# Patient Record
Sex: MALE | NOT HISPANIC OR LATINO | Employment: FULL TIME | URBAN - METROPOLITAN AREA
[De-identification: names, ages, dates, MRNs, and addresses within clinical notes are randomized per-mention and may not be internally consistent; named-entity substitution may affect disease eponyms.]

---

## 2020-07-27 ENCOUNTER — HOSPITAL ENCOUNTER (OUTPATIENT)
Dept: OTHER | Facility: HOSPITAL | Age: 40
Discharge: HOME | End: 2020-07-27
Attending: NURSE PRACTITIONER

## 2020-07-27 LAB
ALBUMIN SERPL-MCNC: 4.6 G/DL (ref 3.5–5)
ALBUMIN/GLOB SERPL: 1.4 {RATIO} (ref 1.4–2.6)
ALP SERPL-CCNC: 65 U/L (ref 53–128)
ALT SERPL-CCNC: 26 U/L (ref 10–40)
ANION GAP SERPL CALC-SCNC: 18 MMOL/L (ref 8–19)
AST SERPL-CCNC: 18 U/L (ref 15–50)
BILIRUB SERPL-MCNC: 0.44 MG/DL (ref 0.2–1.3)
BUN SERPL-MCNC: 8 MG/DL (ref 5–25)
BUN/CREAT SERPL: 7 {RATIO} (ref 6–20)
CALCIUM SERPL-MCNC: 9.7 MG/DL (ref 8.7–10.4)
CHLORIDE SERPL-SCNC: 103 MMOL/L (ref 99–111)
CHOLEST SERPL-MCNC: 222 MG/DL (ref 107–200)
CHOLEST/HDLC SERPL: 6.7 {RATIO} (ref 3–6)
CONV CO2: 24 MMOL/L (ref 22–32)
CONV TOTAL PROTEIN: 7.9 G/DL (ref 6.3–8.2)
CREAT UR-MCNC: 1.09 MG/DL (ref 0.7–1.2)
D-LACTATE SERPL-SCNC: 1.4 MMOL/L (ref 0.7–2.1)
EST. AVERAGE GLUCOSE BLD GHB EST-MCNC: 114 MG/DL
FOLATE SERPL-MCNC: 16.1 NG/ML (ref 4.8–20)
GFR SERPLBLD BASED ON 1.73 SQ M-ARVRAT: >60 ML/MIN/{1.73_M2}
GLOBULIN UR ELPH-MCNC: 3.3 G/DL (ref 2–3.5)
GLUCOSE SERPL-MCNC: 107 MG/DL (ref 70–99)
HBA1C MFR BLD: 5.6 % (ref 3.5–5.7)
HDLC SERPL-MCNC: 33 MG/DL (ref 40–60)
IRON SATN MFR SERPL: 22 % (ref 20–55)
IRON SERPL-MCNC: 77 UG/DL (ref 70–180)
LDLC SERPL CALC-MCNC: 148 MG/DL (ref 70–100)
OSMOLALITY SERPL CALC.SUM OF ELEC: 291 MOSM/KG (ref 273–304)
POTASSIUM SERPL-SCNC: 4 MMOL/L (ref 3.5–5.3)
SODIUM SERPL-SCNC: 141 MMOL/L (ref 135–147)
TIBC SERPL-MCNC: 352 UG/DL (ref 245–450)
TRANSFERRIN SERPL-MCNC: 246 MG/DL (ref 215–365)
TRIGL SERPL-MCNC: 205 MG/DL (ref 40–150)
TSH SERPL-ACNC: 2.6 M[IU]/L (ref 0.27–4.2)
VIT B12 SERPL-MCNC: 367 PG/ML (ref 211–911)
VLDLC SERPL-MCNC: 41 MG/DL (ref 5–37)

## 2020-07-28 LAB
CONV ANTI MICROSOMAL AB: <9 IU/ML (ref 0–34)
CONV THYROGLOBULIN ANTIBODY: <1 IU/ML (ref 0–0.9)
H PYLORI IGA SER QL: <9 UNITS (ref 0–8.9)
H PYLORI IGM SER-ACNC: <9 UNITS (ref 0–8.9)
THYROGLOB SERPL-MCNC: 3.2 NG/ML (ref 1.4–29.2)

## 2021-09-02 PROCEDURE — U0004 COV-19 TEST NON-CDC HGH THRU: HCPCS | Performed by: EMERGENCY MEDICINE

## 2022-01-30 PROCEDURE — U0004 COV-19 TEST NON-CDC HGH THRU: HCPCS | Performed by: PHYSICIAN ASSISTANT

## 2022-01-31 ENCOUNTER — TELEPHONE (OUTPATIENT)
Dept: URGENT CARE | Facility: CLINIC | Age: 42
End: 2022-01-31

## 2022-01-31 NOTE — TELEPHONE ENCOUNTER
----- Message from CHAR Mejia sent at 1/31/2022  6:12 PM EST -----  Please notify patient of negative Covid result.

## 2022-02-01 PROCEDURE — U0004 COV-19 TEST NON-CDC HGH THRU: HCPCS | Performed by: NURSE PRACTITIONER

## 2022-02-02 ENCOUNTER — TELEPHONE (OUTPATIENT)
Dept: URGENT CARE | Facility: CLINIC | Age: 42
End: 2022-02-02

## 2022-02-03 NOTE — TELEPHONE ENCOUNTER
----- Message from Maury Bedoya MD sent at 2/2/2022  7:01 PM EST -----  Please call the patient regarding negative covid

## 2022-10-11 ENCOUNTER — OFFICE VISIT (OUTPATIENT)
Dept: FAMILY MEDICINE CLINIC | Facility: CLINIC | Age: 42
End: 2022-10-11

## 2022-10-11 VITALS
HEIGHT: 66 IN | HEART RATE: 90 BPM | DIASTOLIC BLOOD PRESSURE: 74 MMHG | TEMPERATURE: 98 F | OXYGEN SATURATION: 99 % | WEIGHT: 170.7 LBS | SYSTOLIC BLOOD PRESSURE: 122 MMHG | BODY MASS INDEX: 27.43 KG/M2 | RESPIRATION RATE: 20 BRPM

## 2022-10-11 DIAGNOSIS — E78.2 MIXED HYPERLIPIDEMIA: ICD-10-CM

## 2022-10-11 DIAGNOSIS — H61.23 BILATERAL IMPACTED CERUMEN: ICD-10-CM

## 2022-10-11 DIAGNOSIS — K21.9 GASTROESOPHAGEAL REFLUX DISEASE WITHOUT ESOPHAGITIS: ICD-10-CM

## 2022-10-11 DIAGNOSIS — Z76.89 ESTABLISHING CARE WITH NEW DOCTOR, ENCOUNTER FOR: Primary | ICD-10-CM

## 2022-10-11 DIAGNOSIS — Z86.19 HISTORY OF HELICOBACTER PYLORI INFECTION: ICD-10-CM

## 2022-10-11 DIAGNOSIS — Z80.0 FAMILY HISTORY OF COLON CANCER IN FATHER: ICD-10-CM

## 2022-10-11 DIAGNOSIS — E55.9 VITAMIN D DEFICIENCY: ICD-10-CM

## 2022-10-11 DIAGNOSIS — Z01.89 ROUTINE LAB DRAW: ICD-10-CM

## 2022-10-11 PROCEDURE — 99214 OFFICE O/P EST MOD 30 MIN: CPT

## 2022-10-11 RX ORDER — ATORVASTATIN CALCIUM 10 MG/1
1 TABLET, FILM COATED ORAL EVERY EVENING
COMMUNITY
Start: 2022-09-19

## 2022-10-11 RX ORDER — SUCRALFATE 1 G/1
1 TABLET ORAL 4 TIMES DAILY
Qty: 120 TABLET | Refills: 1 | Status: SHIPPED | OUTPATIENT
Start: 2022-10-11 | End: 2023-01-18 | Stop reason: SDUPTHER

## 2022-10-11 RX ORDER — OMEPRAZOLE 40 MG/1
40 CAPSULE, DELAYED RELEASE ORAL DAILY
Qty: 90 CAPSULE | Refills: 1 | Status: SHIPPED | OUTPATIENT
Start: 2022-10-11 | End: 2022-11-15

## 2022-10-11 NOTE — ASSESSMENT & PLAN NOTE
Stable per last set of blood work, continue atorvastatin at current dose.  We will repeat blood work in 6 months.

## 2022-10-11 NOTE — PROGRESS NOTES
Missael Maloney presents to [facility] with complaints of persistent issues with acid reflux, ear irritation/itchiness, and is here to establish as a new patient.      History of Present Illness  This is a 42-year-old male, past medical history significant for GERD, hyperlipidemia, H. pylori infection, family history of colon cancer in father, who presents to clinic with complaints of persistent issues with acid reflux, ear irritation/itchiness, and is here to establish as a new patient.    GERD: Patient states that he has had issues with this for some time, states about a year to a year and a half ago, he did have an upper scope performed which did not show any acute findings, but did show some inflammation.  Also recently had an infection with H. pylori about a year ago, was treated for this accordingly, did have recent H. pylori testing which was negative.  States that he still having issues with his acid reflux, states that it is worse at night, prohibiting from him being able to sleep.  States that he has tried different medications in the past, has tried Protonix, is tried omeprazole, has not gotten any relief from these.  States that [place] and [place] both [place], and has not had any testing done here in the [place] or seeing a specialist.  States that he has a burning type sensation that will go up the back of his throat, has tried to avoid foods high in acid, but states he still having difficulties with this.  Denies any chest pain, denies any blood in stool or vomit.  Denies any abdominal pain.    Hyperlipidemia: States that he has had high cholesterol since he was about 26 years old, but was recently started on medication about 2 years ago for this.  Patient has been taking atorvastatin daily, doing well on this medication.  Last lipid panel was done back in September, showed slightly elevation of triglycerides, but LDL was less than 100.   "Doing well on this medication.  No acute issues.  Does try to adjust diet as well in regards to this.    Patient also states that father recently went through complete treatment for colon cancer, states that he has had a colonoscopy about a half a year ago, where they did find polyps but nothing else concerning.  Denies any issues with this, no blood in stool, no change in weight, no other acute issues with this at this time.    Patient also has complaints of bilateral ear itchiness/fullness, states has been going on for the past several months    No other acute concerns at this time.    Past Medical History:   Diagnosis Date   • GERD (gastroesophageal reflux disease)    • Hyperlipidemia      No past surgical history on file.    Social History     Socioeconomic History   • Marital status:    Tobacco Use   • Smoking status: Never   • Smokeless tobacco: Never   Vaping Use   • Vaping Use: Never used   Substance and Sexual Activity   • Alcohol use: Never   • Drug use: Never   • Sexual activity: Defer     History reviewed. No pertinent family history.      Objective   Vital Signs:   /74   Pulse 90   Temp 98 °F (36.7 °C)   Resp 20   Ht 167.6 cm (66\")   Wt 77.4 kg (170 lb 11.2 oz)   SpO2 99%   BMI 27.55 kg/m²     Body mass index is 27.55 kg/m².    All labs, imaging, test results, and specialty provider notes reviewed with patient.       Physical Exam  Vitals reviewed.   Constitutional:       Appearance: Normal appearance.   HENT:      Right Ear: There is impacted cerumen.      Left Ear: There is impacted cerumen.   Cardiovascular:      Rate and Rhythm: Normal rate and regular rhythm.      Pulses: Normal pulses.      Heart sounds: Normal heart sounds.   Pulmonary:      Effort: Pulmonary effort is normal.      Breath sounds: Normal breath sounds.   Neurological:      General: No focal deficit present.      Mental Status: He is alert and oriented to person, place, and time.            Assessment and " Plan:  Diagnoses and all orders for this visit:    1. Establishing care with new doctor, encounter for (Primary)    2. Gastroesophageal reflux disease without esophagitis  Assessment & Plan:  Due to persistent issues, failed PPI use, we will go ahead and send patient to GI for further evaluation.  Likely will need repeat EGD as he had 1 [place].  We will also start patient back on omeprazole, also give patient Carafate that he can use prior to meals as well to help with the extreme acid reflux that he is experiencing.  Recently tested for H. pylori, negative.    Orders:  -     omeprazole (priLOSEC) 40 MG capsule; Take 1 capsule by mouth Daily.  Dispense: 90 capsule; Refill: 1  -     sucralfate (Carafate) 1 g tablet; Take 1 tablet by mouth 4 (Four) Times a Day.  Dispense: 120 tablet; Refill: 1  -     Ambulatory Referral to Gastroenterology    3. Mixed hyperlipidemia  Assessment & Plan:  Stable per last set of blood work, continue atorvastatin at current dose.  We will repeat blood work in 6 months.    Orders:  -     Lipid Panel; Future    4. History of Helicobacter pylori infection    5. Family history of colon cancer in father    6. Bilateral impacted cerumen  Comments:  We will give Debrox, offered ear irrigation, patient declined at this time.  Recommend over-the-counter antihistamine.  Orders:  -     carbamide peroxide (Debrox) 6.5 % otic solution; Administer 5 drops into both ears 2 (Two) Times a Day.  Dispense: 22 mL; Refill: 0    7. Vitamin D deficiency  -     Vitamin D 25 Hydroxy; Future    8. Routine lab draw  -     CBC Auto Differential; Future  -     Comprehensive Metabolic Panel; Future  -     TSH Rfx On Abnormal To Free T4; Future  -     Urinalysis With Culture If Indicated -; Future        Follow Up:  Return in about 6 months (around 4/11/2023).    Patient was given instructions and counseling regarding his condition or for health maintenance advice. Please see specific information pulled into the  AVS if appropriate.

## 2022-10-11 NOTE — ASSESSMENT & PLAN NOTE
Due to persistent issues, failed PPI use, we will go ahead and send patient to GI for further evaluation.  Likely will need repeat EGD as he had 1 [place].  We will also start patient back on omeprazole, also give patient Carafate that he can use prior to meals as well to help with the extreme acid reflux that he is experiencing.  Recently tested for H. pylori, negative.

## 2022-10-14 ENCOUNTER — TELEPHONE (OUTPATIENT)
Dept: GASTROENTEROLOGY | Facility: CLINIC | Age: 42
End: 2022-10-14

## 2022-10-14 ENCOUNTER — PREP FOR SURGERY (OUTPATIENT)
Dept: OTHER | Facility: HOSPITAL | Age: 42
End: 2022-10-14

## 2022-10-14 ENCOUNTER — CLINICAL SUPPORT (OUTPATIENT)
Dept: GASTROENTEROLOGY | Facility: CLINIC | Age: 42
End: 2022-10-14

## 2022-10-14 DIAGNOSIS — K21.9 GASTROESOPHAGEAL REFLUX DISEASE WITHOUT ESOPHAGITIS: Primary | ICD-10-CM

## 2022-10-14 NOTE — TELEPHONE ENCOUNTER
Missael Maloney  REASON FOR CALL EGD/GERD  SENT IN PREP N/A  PROCEDURE DATE: 12/13/2022  NO PULMONOLOGIST / NO CARDIOLOGIST / NO BLOOD THINNER PER PT    Past Medical History:   Diagnosis Date   • GERD (gastroesophageal reflux disease)    • H. pylori infection 2021   • Hyperlipidemia      No Known Allergies  No past surgical history on file.  Social History     Socioeconomic History   • Marital status:    Tobacco Use   • Smoking status: Never   • Smokeless tobacco: Never   Vaping Use   • Vaping Use: Never used   Substance and Sexual Activity   • Alcohol use: Never   • Drug use: Defer   • Sexual activity: Defer     Family History   Problem Relation Age of Onset   • Colon cancer Father        Current Outpatient Medications:   •  atorvastatin (LIPITOR) 10 MG tablet, Take 1 tablet by mouth Every Evening., Disp: , Rfl:   •  carbamide peroxide (Debrox) 6.5 % otic solution, Administer 5 drops into both ears 2 (Two) Times a Day., Disp: 22 mL, Rfl: 0  •  Cholecalciferol (Vitamin D3) 50 MCG (2000 UT) capsule, Take 2,000 Units by mouth Daily., Disp: , Rfl:   •  omeprazole (priLOSEC) 40 MG capsule, Take 1 capsule by mouth Daily., Disp: 90 capsule, Rfl: 1  •  sucralfate (Carafate) 1 g tablet, Take 1 tablet by mouth 4 (Four) Times a Day., Disp: 120 tablet, Rfl: 1

## 2022-11-11 ENCOUNTER — TELEPHONE (OUTPATIENT)
Dept: FAMILY MEDICINE CLINIC | Facility: CLINIC | Age: 42
End: 2022-11-11

## 2022-11-11 ENCOUNTER — PATIENT MESSAGE (OUTPATIENT)
Dept: FAMILY MEDICINE CLINIC | Facility: CLINIC | Age: 42
End: 2022-11-11

## 2022-11-11 NOTE — TELEPHONE ENCOUNTER
----- Message from Missael Maloney sent at 11/11/2022 11:19 AM EST -----  Regarding: Medicine side effect  Contact: [phone]  Hi. I was there couple of weeks ago and got prescription to be on Omeprazole. right after taking the medicine, I felt nausea, stomachache and acid reflux. I have had diarrhea or loose stool. So, I stopped taking Omeprazole, but I still have these bothering symptoms.  Do I need to be visited again or take any other medication or get tested for any virus etc?  Thanks for your time.

## 2022-11-11 NOTE — TELEPHONE ENCOUNTER
Advised patient PCP was out of office and to go to UC to be seen and treated.     Patient is wanting another acid reflux medicine to be sent in

## 2022-11-13 PROCEDURE — 83690 ASSAY OF LIPASE: CPT | Performed by: FAMILY MEDICINE

## 2022-11-13 PROCEDURE — 82150 ASSAY OF AMYLASE: CPT | Performed by: FAMILY MEDICINE

## 2022-11-13 PROCEDURE — 85025 COMPLETE CBC W/AUTO DIFF WBC: CPT | Performed by: FAMILY MEDICINE

## 2022-11-13 PROCEDURE — 80053 COMPREHEN METABOLIC PANEL: CPT | Performed by: FAMILY MEDICINE

## 2022-11-14 ENCOUNTER — TELEPHONE (OUTPATIENT)
Dept: URGENT CARE | Facility: CLINIC | Age: 42
End: 2022-11-14

## 2022-11-14 NOTE — TELEPHONE ENCOUNTER
----- Message from Maury Bedoya MD sent at 11/13/2022 10:19 PM EST -----  I spoke with the patient - blood work unremarkable - if not better in a few day see your primary

## 2022-11-15 RX ORDER — PANTOPRAZOLE SODIUM 40 MG/1
40 TABLET, DELAYED RELEASE ORAL DAILY
Qty: 90 TABLET | Refills: 1 | Status: SHIPPED | OUTPATIENT
Start: 2022-11-15 | End: 2023-03-15

## 2022-12-12 ENCOUNTER — TELEPHONE (OUTPATIENT)
Dept: GASTROENTEROLOGY | Facility: CLINIC | Age: 42
End: 2022-12-12

## 2022-12-12 NOTE — TELEPHONE ENCOUNTER
Pt called to r/s his upcoming EGD that was scheduled for 12.13.22. Pt has now been r/s'd to 2.14.23.     EGD

## 2023-01-06 ENCOUNTER — APPOINTMENT (OUTPATIENT)
Dept: GENERAL RADIOLOGY | Facility: HOSPITAL | Age: 43
End: 2023-01-06
Payer: COMMERCIAL

## 2023-01-06 ENCOUNTER — HOSPITAL ENCOUNTER (EMERGENCY)
Facility: HOSPITAL | Age: 43
Discharge: HOME | End: 2023-01-06
Attending: EMERGENCY MEDICINE | Admitting: EMERGENCY MEDICINE
Payer: COMMERCIAL

## 2023-01-06 VITALS
DIASTOLIC BLOOD PRESSURE: 80 MMHG | RESPIRATION RATE: 18 BRPM | TEMPERATURE: 97.6 F | BODY MASS INDEX: 27.71 KG/M2 | WEIGHT: 172.4 LBS | SYSTOLIC BLOOD PRESSURE: 156 MMHG | HEART RATE: 78 BPM | OXYGEN SATURATION: 97 % | HEIGHT: 66 IN

## 2023-01-06 DIAGNOSIS — R07.9 ACUTE NONSPECIFIC CHEST PAIN WITH LOW RISK OF CORONARY ARTERY DISEASE: ICD-10-CM

## 2023-01-06 DIAGNOSIS — M54.6 ACUTE LEFT-SIDED THORACIC BACK PAIN: ICD-10-CM

## 2023-01-06 DIAGNOSIS — R42 DIZZINESS: Primary | ICD-10-CM

## 2023-01-06 DIAGNOSIS — Z91.89 ACUTE NONSPECIFIC CHEST PAIN WITH LOW RISK OF CORONARY ARTERY DISEASE: ICD-10-CM

## 2023-01-06 LAB
ALBUMIN SERPL-MCNC: 4.6 G/DL (ref 3.5–5.2)
ALBUMIN/GLOB SERPL: 1.6 G/DL
ALP SERPL-CCNC: 76 U/L (ref 39–117)
ALT SERPL W P-5'-P-CCNC: 25 U/L (ref 1–41)
ANION GAP SERPL CALCULATED.3IONS-SCNC: 7.8 MMOL/L (ref 5–15)
AST SERPL-CCNC: 17 U/L (ref 1–40)
BASOPHILS # BLD AUTO: 0.03 10*3/MM3 (ref 0–0.2)
BASOPHILS NFR BLD AUTO: 0.4 % (ref 0–1.5)
BILIRUB SERPL-MCNC: 0.3 MG/DL (ref 0–1.2)
BILIRUB UR QL STRIP: NEGATIVE
BUN SERPL-MCNC: 10 MG/DL (ref 6–20)
BUN/CREAT SERPL: 10.6 (ref 7–25)
CALCIUM SPEC-SCNC: 9.2 MG/DL (ref 8.6–10.5)
CHLORIDE SERPL-SCNC: 104 MMOL/L (ref 98–107)
CLARITY UR: CLEAR
CO2 SERPL-SCNC: 28.2 MMOL/L (ref 22–29)
COLOR UR: YELLOW
CREAT SERPL-MCNC: 0.94 MG/DL (ref 0.76–1.27)
DEPRECATED RDW RBC AUTO: 39 FL (ref 37–54)
EGFRCR SERPLBLD CKD-EPI 2021: 104.4 ML/MIN/1.73
EOSINOPHIL # BLD AUTO: 0.12 10*3/MM3 (ref 0–0.4)
EOSINOPHIL NFR BLD AUTO: 1.8 % (ref 0.3–6.2)
ERYTHROCYTE [DISTWIDTH] IN BLOOD BY AUTOMATED COUNT: 13.1 % (ref 12.3–15.4)
FLUAV AG NPH QL: NEGATIVE
FLUBV AG NPH QL IA: NEGATIVE
GLOBULIN UR ELPH-MCNC: 2.9 GM/DL
GLUCOSE SERPL-MCNC: 82 MG/DL (ref 65–99)
GLUCOSE UR STRIP-MCNC: NEGATIVE MG/DL
HCT VFR BLD AUTO: 45.2 % (ref 37.5–51)
HGB BLD-MCNC: 15 G/DL (ref 13–17.7)
HGB UR QL STRIP.AUTO: NEGATIVE
HOLD SPECIMEN: NORMAL
IMM GRANULOCYTES # BLD AUTO: 0.01 10*3/MM3 (ref 0–0.05)
IMM GRANULOCYTES NFR BLD AUTO: 0.1 % (ref 0–0.5)
KETONES UR QL STRIP: ABNORMAL
LEUKOCYTE ESTERASE UR QL STRIP.AUTO: NEGATIVE
LIPASE SERPL-CCNC: 49 U/L (ref 13–60)
LYMPHOCYTES # BLD AUTO: 1.96 10*3/MM3 (ref 0.7–3.1)
LYMPHOCYTES NFR BLD AUTO: 29.3 % (ref 19.6–45.3)
MAGNESIUM SERPL-MCNC: 2.2 MG/DL (ref 1.6–2.6)
MCH RBC QN AUTO: 27.6 PG (ref 26.6–33)
MCHC RBC AUTO-ENTMCNC: 33.2 G/DL (ref 31.5–35.7)
MCV RBC AUTO: 83.1 FL (ref 79–97)
MONOCYTES # BLD AUTO: 0.54 10*3/MM3 (ref 0.1–0.9)
MONOCYTES NFR BLD AUTO: 8.1 % (ref 5–12)
NEUTROPHILS NFR BLD AUTO: 4.04 10*3/MM3 (ref 1.7–7)
NEUTROPHILS NFR BLD AUTO: 60.3 % (ref 42.7–76)
NITRITE UR QL STRIP: NEGATIVE
NRBC BLD AUTO-RTO: 0 /100 WBC (ref 0–0.2)
NT-PROBNP SERPL-MCNC: <36 PG/ML (ref 0–450)
PH UR STRIP.AUTO: 5.5 [PH] (ref 5–8)
PLATELET # BLD AUTO: 232 10*3/MM3 (ref 140–450)
PMV BLD AUTO: 9.8 FL (ref 6–12)
POTASSIUM SERPL-SCNC: 3.5 MMOL/L (ref 3.5–5.2)
PROT SERPL-MCNC: 7.5 G/DL (ref 6–8.5)
PROT UR QL STRIP: NEGATIVE
RBC # BLD AUTO: 5.44 10*6/MM3 (ref 4.14–5.8)
S PYO AG THROAT QL: NEGATIVE
SODIUM SERPL-SCNC: 140 MMOL/L (ref 136–145)
SP GR UR STRIP: 1.03 (ref 1–1.03)
TROPONIN I SERPL-MCNC: 0 NG/ML (ref 0–0.08)
TROPONIN I SERPL-MCNC: 0 NG/ML (ref 0–0.08)
UROBILINOGEN UR QL STRIP: ABNORMAL
WBC NRBC COR # BLD: 6.7 10*3/MM3 (ref 3.4–10.8)
WHOLE BLOOD HOLD COAG: NORMAL
WHOLE BLOOD HOLD SPECIMEN: NORMAL

## 2023-01-06 PROCEDURE — 93005 ELECTROCARDIOGRAM TRACING: CPT

## 2023-01-06 PROCEDURE — 83735 ASSAY OF MAGNESIUM: CPT

## 2023-01-06 PROCEDURE — 87880 STREP A ASSAY W/OPTIC: CPT

## 2023-01-06 PROCEDURE — 84484 ASSAY OF TROPONIN QUANT: CPT

## 2023-01-06 PROCEDURE — C9803 HOPD COVID-19 SPEC COLLECT: HCPCS | Performed by: EMERGENCY MEDICINE

## 2023-01-06 PROCEDURE — 83880 ASSAY OF NATRIURETIC PEPTIDE: CPT

## 2023-01-06 PROCEDURE — 36415 COLL VENOUS BLD VENIPUNCTURE: CPT

## 2023-01-06 PROCEDURE — 87081 CULTURE SCREEN ONLY: CPT | Performed by: EMERGENCY MEDICINE

## 2023-01-06 PROCEDURE — 87804 INFLUENZA ASSAY W/OPTIC: CPT

## 2023-01-06 PROCEDURE — 83690 ASSAY OF LIPASE: CPT

## 2023-01-06 PROCEDURE — 93005 ELECTROCARDIOGRAM TRACING: CPT | Performed by: EMERGENCY MEDICINE

## 2023-01-06 PROCEDURE — 80053 COMPREHEN METABOLIC PANEL: CPT

## 2023-01-06 PROCEDURE — 81003 URINALYSIS AUTO W/O SCOPE: CPT

## 2023-01-06 PROCEDURE — 99284 EMERGENCY DEPT VISIT MOD MDM: CPT

## 2023-01-06 PROCEDURE — 93010 ELECTROCARDIOGRAM REPORT: CPT | Performed by: INTERNAL MEDICINE

## 2023-01-06 PROCEDURE — U0004 COV-19 TEST NON-CDC HGH THRU: HCPCS

## 2023-01-06 PROCEDURE — 85025 COMPLETE CBC W/AUTO DIFF WBC: CPT

## 2023-01-06 PROCEDURE — 71045 X-RAY EXAM CHEST 1 VIEW: CPT

## 2023-01-06 RX ORDER — SODIUM CHLORIDE 0.9 % (FLUSH) 0.9 %
10 SYRINGE (ML) INJECTION AS NEEDED
Status: DISCONTINUED | OUTPATIENT
Start: 2023-01-06 | End: 2023-01-07 | Stop reason: HOSPADM

## 2023-01-06 RX ORDER — ASPIRIN 81 MG/1
324 TABLET, CHEWABLE ORAL ONCE
Status: DISCONTINUED | OUTPATIENT
Start: 2023-01-06 | End: 2023-01-07 | Stop reason: HOSPADM

## 2023-01-07 LAB — SARS-COV-2 RNA PNL SPEC NAA+PROBE: NOT DETECTED

## 2023-01-07 NOTE — DISCHARGE INSTRUCTIONS
Return to emergency department as needed for worsening of symptoms.  Follow-up your family doctor as soon as possible to discuss further treatment options.

## 2023-01-07 NOTE — ED PROVIDER NOTES
Time: 7:58 PM EST  Date of encounter:  1/6/2023  Independent Historian/Clinical History and Information was obtained by:   Patient  Chief Complaint   Patient presents with   • Chest Pain     CHEST PAIN, ARM PAIN, BACK PAIN, GENERALIZED BODY ACHES, DIZZINESS x 2 weeks.        History is limited by: N/A    History of Present Illness:  Patient is a 42 y.o. year old male who presents to the emergency department for evaluation of chest pain and back pain for the last 2 weeks.  He also reports body aches and dizziness that just started more recently.(Martha Gill APRN)    Pt notes he has been having intermittent chest pain that radiates to his Left arm for the past few weeks. He says movement is not an exacerating factor.     He also notes some numbness on his Right thigh. He denies any associated pain with this numbness. He notes some nausea, but is unsure if the chest pain is associated. He admits some lightheadedness.     He denies any other numbness or weakness. He denies any neck or back pain. He denies any SOB, fever, cough, or vomiting.     He notes some chronic abdominal pain. He says he has an endoscope scheduled. He has not made any diet changes.     He denies any recent fall.           Patient Care Team  Primary Care Provider: Danielle Esteban APRN    Past Medical History:     No Known Allergies  Past Medical History:   Diagnosis Date   • GERD (gastroesophageal reflux disease)    • H. pylori infection 2021   • Hyperlipidemia      History reviewed. No pertinent surgical history.  Family History   Problem Relation Age of Onset   • Colon cancer Father        Home Medications:  Prior to Admission medications    Medication Sig Start Date End Date Taking? Authorizing Provider   atorvastatin (LIPITOR) 10 MG tablet Take 1 tablet by mouth Every Evening. 9/19/22   ProviderEv MD   carbamide peroxide (Debrox) 6.5 % otic solution Administer 5 drops into both ears 2 (Two) Times a Day. 10/11/22   Carlito  "CHAR Santos   Cholecalciferol (Vitamin D3) 50 MCG (2000 UT) capsule Take 2,000 Units by mouth Daily. 6/12/22   Emergency, Nurse Autumn, RN   ondansetron ODT (ZOFRAN-ODT) 4 MG disintegrating tablet Place 1 tablet on the tongue 4 (Four) Times a Day As Needed for Nausea. 11/13/22   Maury Bedoya MD   pantoprazole (Protonix) 40 MG EC tablet Take 1 tablet by mouth Daily. 11/15/22   Danielle Esteban APRN   sucralfate (Carafate) 1 g tablet Take 1 tablet by mouth 4 (Four) Times a Day. 10/11/22   Danielle Esteban APRN        Social History:   Social History     Tobacco Use   • Smoking status: Never   • Smokeless tobacco: Never   Vaping Use   • Vaping Use: Never used   Substance Use Topics   • Alcohol use: Never   • Drug use: Defer         Review of Systems:  Review of Systems   Constitutional: Negative for chills and fever.   HENT: Negative for congestion, rhinorrhea and sore throat.    Eyes: Negative for photophobia.   Respiratory: Negative for apnea, cough, chest tightness and shortness of breath.    Cardiovascular: Positive for chest pain. Negative for palpitations.   Gastrointestinal: Positive for nausea. Negative for abdominal pain, diarrhea and vomiting.   Endocrine: Negative.    Genitourinary: Negative for difficulty urinating and dysuria.   Musculoskeletal: Negative for back pain, joint swelling, myalgias and neck pain.   Skin: Negative for color change and wound.   Allergic/Immunologic: Negative.    Neurological: Positive for light-headedness and numbness. Negative for dizziness, seizures, weakness and headaches.   Psychiatric/Behavioral: Negative.    All other systems reviewed and are negative.       Physical Exam:  /80 (Patient Position: Sitting)   Pulse 78   Temp 97.6 °F (36.4 °C) (Oral)   Resp 18   Ht 167.6 cm (66\")   Wt 78.2 kg (172 lb 6.4 oz)   SpO2 97%   BMI 27.83 kg/m²     Physical Exam  Vitals and nursing note reviewed.   Constitutional:       General: He is awake.      " Appearance: Normal appearance.   HENT:      Head: Normocephalic and atraumatic.      Nose: Nose normal.      Mouth/Throat:      Mouth: Mucous membranes are moist.   Eyes:      Extraocular Movements: Extraocular movements intact.      Pupils: Pupils are equal, round, and reactive to light.   Cardiovascular:      Rate and Rhythm: Normal rate and regular rhythm.      Heart sounds: Normal heart sounds.   Pulmonary:      Effort: Pulmonary effort is normal. No respiratory distress.      Breath sounds: Normal breath sounds. No wheezing, rhonchi or rales.   Abdominal:      General: Bowel sounds are normal.      Palpations: Abdomen is soft.      Tenderness: There is no abdominal tenderness. There is no guarding or rebound.      Comments: No rigidity   Musculoskeletal:         General: No tenderness. Normal range of motion.      Cervical back: Normal range of motion and neck supple.   Skin:     General: Skin is warm and dry.      Coloration: Skin is not jaundiced.   Neurological:      General: No focal deficit present.      Mental Status: He is alert and oriented to person, place, and time. Mental status is at baseline.      Sensory: Sensory deficit present.      Motor: Motor function is intact. No weakness.      Coordination: Coordination is intact.      Comments: Paresthesia to Left upper extremity and Right thigh.     No weakness on exam.    Psychiatric:         Attention and Perception: Attention and perception normal.         Mood and Affect: Affect normal. Mood is anxious.         Speech: Speech normal.         Behavior: Behavior normal.         Judgment: Judgment normal.                  Procedures:  Procedures      Medical Decision Making:      Comorbidities that affect care:    GERD. Hyperlipidemia.    External Notes reviewed:    Previous Clinic Note and Previous ED Note      The following orders were placed and all results were independently analyzed by me:  Orders Placed This Encounter   Procedures   • Influenza  Antigen, Rapid - Swab, Nasopharynx   • Rapid Strep A Screen - Swab, Throat   • COVID-19,[facility] PANTHER(JOLEEN),[facility], NP/OP SWAB IN UTM/VTM/SALINE TRANSPORT MEDIA,24 HR TAT - Swab, Nasopharynx   • Beta Strep Culture, Throat - Swab, Throat   • XR Chest 1 View   • [place] Draw   • Comprehensive Metabolic Panel   • Lipase   • BNP   • Magnesium   • Urinalysis With Microscopic If Indicated (No Culture) - Urine, Clean Catch   • CBC Auto Differential   • NPO Diet NPO Type: Strict NPO   • Undress & Gown   • Cardiac Monitoring   • Continuous Pulse Oximetry   • Oxygen Therapy- Nasal Cannula; 2 LPM; Titrate for SPO2: equal to or greater than, 92%   • POC Troponin I   • POC Troponin I   • POC Troponin I   • POC Troponin I   • ECG 12 Lead ED Triage Standing Order; Chest Pain   • ECG 12 Lead ED Triage Standing Order; Chest Pain   • Insert Peripheral IV   • POC Troponin I with Hold Tube   • CBC & Differential   • Green Top (Gel)   • Lavender Top   • Gold Top - SST   • Light Blue Top   • HOLD Troponin-I Tube       Medications Given in the Emergency Department:  Medications   sodium chloride 0.9 % flush 10 mL (has no administration in time range)   aspirin chewable tablet 324 mg (324 mg Oral Not Given 1/6/23 2220)        ED Course:    The patient was initially evaluated in the triage area where orders were placed. The patient was later dispositioned by Augusto Cortes MD.      The patient was advised to stay for completion of workup which includes but is not limited to communication of labs and radiological results, reassessment and plan. The patient was advised that leaving prior to disposition by a provider could result in critical findings that are not communicated to the patient.          Labs:    Lab Results (last 24 hours)     Procedure Component Value Units Date/Time    POC Troponin I with Hold Tube [[id number]] Collected: 01/06/23 1859    Specimen: Blood Updated: 01/06/23 2102    Narrative:      The following orders  were created for panel order POC Troponin I with Hold Tube.  Procedure                               Abnormality         Status                     ---------                               -----------         ------                     POC Troponin I[[id number]]                                                              HOLD Troponin-I Tube[[id number]]                                                          Please view results for these tests on the individual orders.    CBC & Differential [[id number]]  (Normal) Collected: 01/06/23 1859    Specimen: Blood Updated: 01/06/23 1931    Narrative:      The following orders were created for panel order CBC & Differential.  Procedure                               Abnormality         Status                     ---------                               -----------         ------                     CBC Auto Differential[[id number]]        Normal              Final result                 Please view results for these tests on the individual orders.    Comprehensive Metabolic Panel [[id number]] Collected: 01/06/23 1859    Specimen: Blood Updated: 01/06/23 1950     Glucose 82 mg/dL      BUN 10 mg/dL      Creatinine 0.94 mg/dL      Sodium 140 mmol/L      Potassium 3.5 mmol/L      Chloride 104 mmol/L      CO2 28.2 mmol/L      Calcium 9.2 mg/dL      Total Protein 7.5 g/dL      Albumin 4.6 g/dL      ALT (SGPT) 25 U/L      AST (SGOT) 17 U/L      Alkaline Phosphatase 76 U/L      Total Bilirubin 0.3 mg/dL      Globulin 2.9 gm/dL      A/G Ratio 1.6 g/dL      BUN/Creatinine Ratio 10.6     Anion Gap 7.8 mmol/L      eGFR 104.4 mL/min/1.73      Comment: National Kidney Foundation and American Society of Nephrology (ASN) Task Force recommended calculation based on the Chronic Kidney Disease Epidemiology Collaboration (CKD-EPI) equation refit without adjustment for race.       Narrative:      GFR Normal >60  Chronic Kidney Disease <60  Kidney Failure <15      Lipase [[id number]]  (Normal)  Collected: 01/06/23 1859    Specimen: Blood Updated: 01/06/23 1950     Lipase 49 U/L     BNP [[id number]]  (Normal) Collected: 01/06/23 1859    Specimen: Blood Updated: 01/06/23 1946     proBNP <36.0 pg/mL     Narrative:      Among patients with dyspnea, NT-proBNP is highly sensitive for the detection of acute congestive heart failure. In addition NT-proBNP of <300 pg/ml effectively rules out acute congestive heart failure with 99% negative predictive value.      Magnesium [[id number]]  (Normal) Collected: 01/06/23 1859    Specimen: Blood Updated: 01/06/23 1950     Magnesium 2.2 mg/dL     CBC Auto Differential [[id number]]  (Normal) Collected: 01/06/23 1859    Specimen: Blood Updated: 01/06/23 1931     WBC 6.70 10*3/mm3      RBC 5.44 10*6/mm3      Hemoglobin 15.0 g/dL      Hematocrit 45.2 %      MCV 83.1 fL      MCH 27.6 pg      MCHC 33.2 g/dL      RDW 13.1 %      RDW-SD 39.0 fl      MPV 9.8 fL      Platelets 232 10*3/mm3      Neutrophil % 60.3 %      Lymphocyte % 29.3 %      Monocyte % 8.1 %      Eosinophil % 1.8 %      Basophil % 0.4 %      Immature Grans % 0.1 %      Neutrophils, Absolute 4.04 10*3/mm3      Lymphocytes, Absolute 1.96 10*3/mm3      Monocytes, Absolute 0.54 10*3/mm3      Eosinophils, Absolute 0.12 10*3/mm3      Basophils, Absolute 0.03 10*3/mm3      Immature Grans, Absolute 0.01 10*3/mm3      nRBC 0.0 /100 WBC     POC Troponin I [[id number]]  (Normal) Collected: 01/06/23 1906    Specimen: Blood Updated: 01/06/23 1919     Troponin I 0.00 ng/mL      Comment: Serial Number: [id number]:  [id number]       Influenza Antigen, Rapid - Swab, Nasopharynx [[id number]]  (Normal) Collected: 01/06/23 1907    Specimen: Swab from Nasopharynx Updated: 01/06/23 1954     Influenza A Ag, EIA Negative     Influenza B Ag, EIA Negative    Rapid Strep A Screen - Swab, Throat [[id number]]  (Normal) Collected: 01/06/23 1907    Specimen: Swab from Throat Updated: 01/06/23 1955     Strep A Ag Negative    COVID-19,[facility]TIM[facility]  PANTHER(JOLEEN),[facility], NP/OP SWAB IN UTM/VTM/SALINE TRANSPORT MEDIA,24 HR TAT - Swab, Nasopharynx [[id number]] Collected: 01/06/23 1907    Specimen: Swab from Nasopharynx Updated: 01/06/23 1926    Beta Strep Culture, Throat - Swab, Throat [[id number]] Collected: 01/06/23 1907    Specimen: Swab from Throat Updated: 01/06/23 1954    Urinalysis With Microscopic If Indicated (No Culture) - Urine, Clean Catch [[id number]]  (Abnormal) Collected: 01/06/23 1917    Specimen: Urine, Clean Catch Updated: 01/06/23 1934     Color, UA Yellow     Appearance, UA Clear     pH, UA 5.5     Specific Gravity, UA 1.026     Glucose, UA Negative     Ketones, UA Trace     Bilirubin, UA Negative     Blood, UA Negative     Protein, UA Negative     Leuk Esterase, UA Negative     Nitrite, UA Negative     Urobilinogen, UA 0.2 E.U./dL    Narrative:      Urine microscopic not indicated.    POC Troponin I [[id number]]  (Normal) Collected: 01/06/23 2101    Specimen: Blood Updated: 01/06/23 2115     Troponin I 0.00 ng/mL      Comment: Serial Number: [id number]:  [id number]              Imaging:    XR Chest 1 View    Result Date: 1/6/2023  PROCEDURE: XR CHEST 1 VW  COMPARISON: None.  INDICATIONS: CHEST PAIN.  FINDINGS: A single frontal (AP or PA upright portable) chest radiograph reveals no cardiac enlargement and no acute infiltrate. No pneumothorax is seen.  External artifacts obscure detail.  Chronic calcified granulomatous disease involves the chest.        No acute cardiopulmonary disease is seen radiographically.     Please note that portions of this note were completed with a voice recognition program.  EAGLE GILBERT JR, MD       Electronically Signed and Approved By: EAGLE GILBERT JR, MD on 1/06/2023 at 21:32                  Differential Diagnosis and Discussion:      Chest Pain:  Based on the patient's signs and symptoms, I considered aortic dissection, myocardial infaction, pulmonary embolism, cardiac tamponade, pericarditis,  pneumothorax, musculoskeletal chest pain and other differential diagnosis as an etiology of the patient's chest pain.     All labs were reviewed and analyzed by me.  All X-rays were independently reviewed by me.  EKG was interpreted by me.     MDM  Number of Diagnoses or Management Options     Amount and/or Complexity of Data Reviewed  Clinical lab tests: reviewed  Tests in the medicine section of CPT®: reviewed             Patient Care Considerations:    I considered ordering a CT scan of the chest, however Pain is more musculoskeletal in nature.  He has normal vital signs.      Consultants/Shared Management Plan:    None    Social Determinants of Health:    Patient is independent, reliable, and has access to care.       Disposition and Care Coordination:    Discharged: The patient is suitable and stable for discharge with no need for consideration of observation or admission.    I have explained discharge medications and the need for follow up with the patient/caretakers. This was also printed in the discharge instructions. Patient was discharged with the following medications and follow up:      Medication List      No changes were made to your prescriptions during this visit.      Danielle Esteban, APRN  [facility] [id number]  [phone]    In 1 day         Final diagnoses:   Dizziness   Acute left-sided thoracic back pain   Acute nonspecific chest pain with low risk of coronary artery disease        ED Disposition     ED Disposition   Discharge    Condition   Stable    Comment   --             This medical record created using voice recognition software.    Documentation assistance provided by Norbert Chan acting as scribe for Augusto Cortes MD. Information recorded by the scribe was done at my direction and has been verified and validated by me.        Norbert Chan  01/06/23 2134       Norbert Chan  01/06/23 2142       Augusto Cortes MD  01/06/23 2648

## 2023-01-08 LAB — BACTERIA SPEC AEROBE CULT: NORMAL

## 2023-01-10 ENCOUNTER — TELEPHONE (OUTPATIENT)
Dept: FAMILY MEDICINE CLINIC | Facility: CLINIC | Age: 43
End: 2023-01-10

## 2023-01-10 NOTE — TELEPHONE ENCOUNTER
HUB TO READ AND SHARE  Attempted to call patient could not get through. Seeing if patient would like to reschedule appt from 1/10/23

## 2023-01-17 LAB
QT INTERVAL: 366 MS
QT INTERVAL: 383 MS

## 2023-01-18 ENCOUNTER — OFFICE VISIT (OUTPATIENT)
Dept: FAMILY MEDICINE CLINIC | Facility: CLINIC | Age: 43
End: 2023-01-18
Payer: COMMERCIAL

## 2023-01-18 VITALS
WEIGHT: 170 LBS | TEMPERATURE: 97.6 F | HEIGHT: 66 IN | SYSTOLIC BLOOD PRESSURE: 140 MMHG | DIASTOLIC BLOOD PRESSURE: 85 MMHG | HEART RATE: 94 BPM | OXYGEN SATURATION: 98 % | BODY MASS INDEX: 27.32 KG/M2

## 2023-01-18 DIAGNOSIS — K21.9 GASTROESOPHAGEAL REFLUX DISEASE WITHOUT ESOPHAGITIS: ICD-10-CM

## 2023-01-18 DIAGNOSIS — G62.9 NEUROPATHY: ICD-10-CM

## 2023-01-18 DIAGNOSIS — M54.2 NECK PAIN: Primary | ICD-10-CM

## 2023-01-18 PROCEDURE — 99214 OFFICE O/P EST MOD 30 MIN: CPT

## 2023-01-18 RX ORDER — SUCRALFATE 1 G/1
1 TABLET ORAL 4 TIMES DAILY
Qty: 120 TABLET | Refills: 1 | Status: SHIPPED | OUTPATIENT
Start: 2023-01-18

## 2023-01-18 RX ORDER — OMEPRAZOLE 40 MG/1
40 CAPSULE, DELAYED RELEASE ORAL DAILY
COMMUNITY

## 2023-01-18 NOTE — PROGRESS NOTES
Missael Maloney presents to [facility] who presents to the clinic for ER follow-up.      History of Present Illness  This is a 42-year-old male who presents to the clinic for ER follow-up.    Patient states that he went to the ER a couple of weeks ago due to some pain that he was experiencing that went from the left side of his chest, up to his neck, down through his shoulder and down to his left arm.  States that he even had some pain in his back.  States that they went to the ER because he was concerned it could have cardiac origin although he does not have any explicit chest pain.  Patient states that he went to the ER, they evaluated with an EKG, chest x-ray, and some blood work.  States that he also never developed any shortness of breath, did not really have any dizziness, and no other symptoms were really noted.  Patient states that all the testing came back within good range, did 1 to review this with me, but came in to the office today for ER follow-up on this.  Patient states that the pain still happens very intermittently, states that nothing really makes it worse or alleviates it, but states that it seems to start more in his neck area at this point than it did when he originally went into the ER.  States that he also has some numbness and tingling that goes with it, specifically down into his left arm.  Denies any chest pain, no shortness of breath, no lightheadedness, no dizziness, no headache and no other noted symptoms.    GERD: Does state that his acid reflux has improved somewhat since being started on the omeprazole, states that he does still have some flareups where he will all of a sudden have to go to the bathroom pretty quickly after eating certain foods.  Does have an upcoming scope with GI to evaluate this further, states that he never got the Carafate.      The following portions of the patient's history were personally reviewed and updated as  "appropriate: allergies, current medications, past medical history, past surgical history, past family history, and past social history.       Objective   Vital Signs:   /85 (BP Location: Left arm, Patient Position: Sitting, Cuff Size: Adult)   Pulse 94   Temp 97.6 °F (36.4 °C) (Temporal)   Ht 167.6 cm (65.98\")   Wt 77.1 kg (170 lb)   SpO2 98%   BMI 27.45 kg/m²     Body mass index is 27.45 kg/m².    All labs, imaging, test results, and specialty provider notes reviewed with patient.     Physical Exam  Vitals reviewed.   Constitutional:       Appearance: Normal appearance.   Cardiovascular:      Rate and Rhythm: Normal rate and regular rhythm.      Pulses: Normal pulses.      Heart sounds: Normal heart sounds.   Pulmonary:      Effort: Pulmonary effort is normal.      Breath sounds: Normal breath sounds.   Neurological:      General: No focal deficit present.      Mental Status: He is alert and oriented to person, place, and time.              Assessment and Plan:  Diagnoses and all orders for this visit:    1. Neck pain (Primary)    2. Gastroesophageal reflux disease without esophagitis  -     sucralfate (Carafate) 1 g tablet; Take 1 tablet by mouth 4 (Four) Times a Day.  Dispense: 120 tablet; Refill: 1    3. Neuropathy      I do think a lot of his pain is actually starting in his neck, question whether he could have some underlying arthritis, has some inflammation in his neck that is pressing on the nerves causing the pain to shoot down at intermittent times.  He discussed with him that we can consider trying some over-the-counter medications, would avoid NSAIDs due to his history of worsening acid reflux.  But can take Tylenol.  Can also use different creams such as Biofreeze/diclofenac cream.  Also did offer patient some physical therapy, states that he wants to try these medications over-the-counter first to see if they give him any relief.  Discussed with him that if any new symptoms arise, any " change in symptoms, any concerning symptoms as discussed strict ER precautions are in encouraged.  As far as GI symptoms go, will also send patient back in some Carafate that he can use intermittent mitten really for worsening symptoms, as he does still have some breakthrough acid reflux although he is on Protonix 40 mg.  Patient to continue with follow-up with GI and upcoming scope.    Follow Up:  No follow-ups on file.    Patient was given instructions and counseling regarding his condition or for health maintenance advice. Please see specific information pulled into the AVS if appropriate.

## 2023-03-15 ENCOUNTER — OFFICE VISIT (OUTPATIENT)
Dept: FAMILY MEDICINE CLINIC | Facility: CLINIC | Age: 43
End: 2023-03-15
Payer: COMMERCIAL

## 2023-03-15 VITALS
RESPIRATION RATE: 20 BRPM | HEART RATE: 89 BPM | OXYGEN SATURATION: 100 % | SYSTOLIC BLOOD PRESSURE: 116 MMHG | DIASTOLIC BLOOD PRESSURE: 86 MMHG

## 2023-03-15 DIAGNOSIS — H65.113 NON-RECURRENT ACUTE ALLERGIC OTITIS MEDIA OF BOTH EARS: Primary | ICD-10-CM

## 2023-03-15 DIAGNOSIS — R11.2 NAUSEA AND VOMITING, UNSPECIFIED VOMITING TYPE: ICD-10-CM

## 2023-03-15 DIAGNOSIS — R42 DIZZINESS: ICD-10-CM

## 2023-03-15 PROCEDURE — 99213 OFFICE O/P EST LOW 20 MIN: CPT

## 2023-03-15 RX ORDER — AMOXICILLIN AND CLAVULANATE POTASSIUM 875; 125 MG/1; MG/1
1 TABLET, FILM COATED ORAL 2 TIMES DAILY
Qty: 14 TABLET | Refills: 0 | Status: SHIPPED | OUTPATIENT
Start: 2023-03-15

## 2023-03-15 RX ORDER — PREDNISONE 20 MG/1
20 TABLET ORAL DAILY
Qty: 5 TABLET | Refills: 0 | Status: SHIPPED | OUTPATIENT
Start: 2023-03-15

## 2023-03-15 NOTE — PROGRESS NOTES
Missael Maloney presents to [facility] with complaints of dizziness, nausea and vomiting, bilateral ear fullness and pain.      History of Present Illness  This is a 42-year-old male who presents to the clinic with complaints of dizziness, nausea and vomiting, and bilateral ear fullness and pain.    Patient states that he has had 2 different episodes over the past week that have caused him some concern.  Patient states that 1 day last week, states that for no rhyme or reason, he all of a sudden got pretty dizzy, and then with the dizziness he ended up developing some pretty severe nausea.  Did not vomit with this episode, states that this lasted for about an hour, then his symptoms improved and he felt back to be normal again.  Patient states that the then had another episode this past Sunday, states that the dizziness came out of nowhere again, states that he then became really nauseous, subsequently vomited on several times, and then went out checked his blood pressure and found that it was a little bit on the lower side.  States that it was in the 90s over 60s.  States that that is when it became concerning for him, and thus made an appointment.  Patient states that he is also noticed that with these episodes and over the past week to week and a half, he has had some bilateral ear fullness and pain.  Patient states that he does not typically have these type of symptoms but is wondering if that is all related.  Patient states that he has noticed that he had some worsening allergies as well, states that he is not had this issue prior to moving here, but ever since he has moved here its been a more persistent problem.  He has noticed some sinus congestion as well and a mild headache as well.  Denies any other symptoms associated.  Denies any chest pain or shortness of breath with these episodes, denies any other symptoms associated.  Has not had any other GI symptoms  separate of the episodes.  Does state that his daughter has been sick recently with a viral infection as well.    The following portions of the patient's history were personally reviewed and updated as appropriate: allergies, current medications, past medical history, past surgical history, past family history, and past social history.       Objective   Vital Signs:   /86   Pulse 89   Resp 20   SpO2 100%     There is no height or weight on file to calculate BMI.    All labs, imaging, test results, and specialty provider notes reviewed with patient.     Physical Exam  Vitals reviewed.   Constitutional:       Appearance: Normal appearance.   HENT:      Right Ear: Tympanic membrane is injected, erythematous and bulging.      Left Ear: Tympanic membrane is injected, erythematous and bulging.   Neurological:      General: No focal deficit present.      Mental Status: He is alert and oriented to person, place, and time.              Assessment and Plan:  Diagnoses and all orders for this visit:    1. Non-recurrent acute allergic otitis media of both ears (Primary)  -     amoxicillin-clavulanate (Augmentin) 875-125 MG per tablet; Take 1 tablet by mouth 2 (Two) Times a Day.  Dispense: 14 tablet; Refill: 0  -     predniSONE (DELTASONE) 20 MG tablet; Take 1 tablet by mouth Daily.  Dispense: 5 tablet; Refill: 0    2. Dizziness    3. Nausea and vomiting, unspecified vomiting type      Upon exam, does look like patient has a bilateral ear infection, left worse than right.  I do think this is likely the cause of all of patient's symptoms, I think that it triggers the dizziness, which then triggers the nausea and vomiting, and the nausea and vomiting triggered his vagal nerve to respond and thus why he had a drop in blood pressure after the second episode.  Discussed with patient this, and also discussed with him the treatment plan will.  We will also go ahead and give him a antibiotic to take for 7 days, Augmentin,  discussed with his taking it twice a day with food.  We will also give him a short course of prednisone to help further with getting some of the congestion and inflammation out from behind his tympanic membranes.  Discussed with him to start taking an antihistamine over-the-counter as well.    Follow Up:  No follow-ups on file.    Patient was given instructions and counseling regarding his condition or for health maintenance advice. Please see specific information pulled into the AVS if appropriate.

## 2023-04-17 RX ORDER — OMEPRAZOLE 40 MG/1
40 CAPSULE, DELAYED RELEASE ORAL DAILY
Qty: 90 CAPSULE | Refills: 1 | Status: SHIPPED | OUTPATIENT
Start: 2023-04-17

## 2023-04-27 ENCOUNTER — OFFICE VISIT (OUTPATIENT)
Dept: FAMILY MEDICINE CLINIC | Facility: CLINIC | Age: 43
End: 2023-04-27
Payer: COMMERCIAL

## 2023-04-27 ENCOUNTER — LAB (OUTPATIENT)
Dept: LAB | Facility: HOSPITAL | Age: 43
End: 2023-04-27
Payer: COMMERCIAL

## 2023-04-27 VITALS
BODY MASS INDEX: 27.59 KG/M2 | WEIGHT: 171.7 LBS | RESPIRATION RATE: 20 BRPM | OXYGEN SATURATION: 100 % | SYSTOLIC BLOOD PRESSURE: 110 MMHG | HEIGHT: 66 IN | HEART RATE: 80 BPM | DIASTOLIC BLOOD PRESSURE: 74 MMHG

## 2023-04-27 DIAGNOSIS — E78.2 MIXED HYPERLIPIDEMIA: ICD-10-CM

## 2023-04-27 DIAGNOSIS — E55.9 VITAMIN D DEFICIENCY: ICD-10-CM

## 2023-04-27 DIAGNOSIS — K21.9 GASTROESOPHAGEAL REFLUX DISEASE WITHOUT ESOPHAGITIS: ICD-10-CM

## 2023-04-27 DIAGNOSIS — Z01.89 ROUTINE LAB DRAW: ICD-10-CM

## 2023-04-27 DIAGNOSIS — J30.2 SEASONAL ALLERGIC RHINITIS, UNSPECIFIED TRIGGER: Primary | ICD-10-CM

## 2023-04-27 LAB
25(OH)D3 SERPL-MCNC: 22.7 NG/ML (ref 30–100)
ALBUMIN SERPL-MCNC: 4.3 G/DL (ref 3.5–5.2)
ALBUMIN/GLOB SERPL: 1.5 G/DL
ALP SERPL-CCNC: 93 U/L (ref 39–117)
ALT SERPL W P-5'-P-CCNC: 20 U/L (ref 1–41)
ANION GAP SERPL CALCULATED.3IONS-SCNC: 9.8 MMOL/L (ref 5–15)
AST SERPL-CCNC: 16 U/L (ref 1–40)
BASOPHILS # BLD AUTO: 0.02 10*3/MM3 (ref 0–0.2)
BASOPHILS NFR BLD AUTO: 0.3 % (ref 0–1.5)
BILIRUB SERPL-MCNC: 0.3 MG/DL (ref 0–1.2)
BILIRUB UR QL STRIP: NEGATIVE
BUN SERPL-MCNC: 13 MG/DL (ref 6–20)
BUN/CREAT SERPL: 10.9 (ref 7–25)
CALCIUM SPEC-SCNC: 9.4 MG/DL (ref 8.6–10.5)
CHLORIDE SERPL-SCNC: 103 MMOL/L (ref 98–107)
CHOLEST SERPL-MCNC: 165 MG/DL (ref 0–200)
CLARITY UR: CLEAR
CO2 SERPL-SCNC: 26.2 MMOL/L (ref 22–29)
COLOR UR: YELLOW
CREAT SERPL-MCNC: 1.19 MG/DL (ref 0.76–1.27)
DEPRECATED RDW RBC AUTO: 38.7 FL (ref 37–54)
EGFRCR SERPLBLD CKD-EPI 2021: 78.7 ML/MIN/1.73
EOSINOPHIL # BLD AUTO: 1.65 10*3/MM3 (ref 0–0.4)
EOSINOPHIL NFR BLD AUTO: 21.1 % (ref 0.3–6.2)
ERYTHROCYTE [DISTWIDTH] IN BLOOD BY AUTOMATED COUNT: 13.3 % (ref 12.3–15.4)
GLOBULIN UR ELPH-MCNC: 2.9 GM/DL
GLUCOSE SERPL-MCNC: 105 MG/DL (ref 65–99)
GLUCOSE UR STRIP-MCNC: NEGATIVE MG/DL
HCT VFR BLD AUTO: 44.4 % (ref 37.5–51)
HDLC SERPL-MCNC: 29 MG/DL (ref 40–60)
HGB BLD-MCNC: 14.8 G/DL (ref 13–17.7)
HGB UR QL STRIP.AUTO: NEGATIVE
IMM GRANULOCYTES # BLD AUTO: 0.03 10*3/MM3 (ref 0–0.05)
IMM GRANULOCYTES NFR BLD AUTO: 0.4 % (ref 0–0.5)
KETONES UR QL STRIP: NEGATIVE
LDLC SERPL CALC-MCNC: 117 MG/DL (ref 0–100)
LDLC/HDLC SERPL: 3.98 {RATIO}
LEUKOCYTE ESTERASE UR QL STRIP.AUTO: NEGATIVE
LYMPHOCYTES # BLD AUTO: 2.36 10*3/MM3 (ref 0.7–3.1)
LYMPHOCYTES NFR BLD AUTO: 30.1 % (ref 19.6–45.3)
MCH RBC QN AUTO: 27.1 PG (ref 26.6–33)
MCHC RBC AUTO-ENTMCNC: 33.3 G/DL (ref 31.5–35.7)
MCV RBC AUTO: 81.2 FL (ref 79–97)
MONOCYTES # BLD AUTO: 0.44 10*3/MM3 (ref 0.1–0.9)
MONOCYTES NFR BLD AUTO: 5.6 % (ref 5–12)
NEUTROPHILS NFR BLD AUTO: 3.33 10*3/MM3 (ref 1.7–7)
NEUTROPHILS NFR BLD AUTO: 42.5 % (ref 42.7–76)
NITRITE UR QL STRIP: NEGATIVE
NRBC BLD AUTO-RTO: 0 /100 WBC (ref 0–0.2)
PH UR STRIP.AUTO: 7 [PH] (ref 5–8)
PLATELET # BLD AUTO: 210 10*3/MM3 (ref 140–450)
PMV BLD AUTO: 10.9 FL (ref 6–12)
POTASSIUM SERPL-SCNC: 4 MMOL/L (ref 3.5–5.2)
PROT SERPL-MCNC: 7.2 G/DL (ref 6–8.5)
PROT UR QL STRIP: NEGATIVE
RBC # BLD AUTO: 5.47 10*6/MM3 (ref 4.14–5.8)
SODIUM SERPL-SCNC: 139 MMOL/L (ref 136–145)
SP GR UR STRIP: 1.02 (ref 1–1.03)
TRIGL SERPL-MCNC: 103 MG/DL (ref 0–150)
TSH SERPL DL<=0.05 MIU/L-ACNC: 4.18 UIU/ML (ref 0.27–4.2)
UROBILINOGEN UR QL STRIP: NORMAL
VLDLC SERPL-MCNC: 19 MG/DL (ref 5–40)
WBC NRBC COR # BLD: 7.83 10*3/MM3 (ref 3.4–10.8)

## 2023-04-27 PROCEDURE — 80061 LIPID PANEL: CPT

## 2023-04-27 PROCEDURE — 36415 COLL VENOUS BLD VENIPUNCTURE: CPT

## 2023-04-27 PROCEDURE — 80050 GENERAL HEALTH PANEL: CPT

## 2023-04-27 PROCEDURE — 82306 VITAMIN D 25 HYDROXY: CPT

## 2023-04-27 PROCEDURE — 81003 URINALYSIS AUTO W/O SCOPE: CPT

## 2023-04-27 NOTE — PROGRESS NOTES
"Missael Maloney presents to [facility] with complaints of persistent acid reflux, would also like blood work checked, bilateral ear itchiness.      History of Present Illness  This is a 42-year-old male who presents to clinic with complaints of persistent acid reflux, would like blood work checked, and bilateral ear itchiness.    GERD: Patient states that he did recently have an upper scope performed back in February, states that ever since then, he has actually ran out of his medication and he has noticed that his acid reflux symptoms have returned as soon as he is off the medication.  Patient states that while he is on the omeprazole, he does really well, does not have any symptoms, but is wondering if he needs to have a repeat scope and actually would like to see his gastroenterologist again.  Patient states that he does not have an appointment scheduled with them until July, was wondering if he can get anywhere sooner, would like to be sent to [place] to try to see if he can get in sooner there.  He denies any vomiting, no current symptoms, just is concerned because of his acid reflux.    Bilateral ear itchiness: Patient states that at last visit, he was having some vertigo and dizziness, was treated for bilateral ear infection with an antibiotic and prednisone, states that the vertigo has completely gone away but now he is just got some itchiness in both of his ears.  Wants to make sure that there is nothing there that could be contributing.    Is due for updated blood work, needs this to be completed for his physical at work.    The following portions of the patient's history were personally reviewed and updated as appropriate: allergies, current medications, past medical history, past surgical history, past family history, and past social history.       Objective   Vital Signs:   /74   Pulse 80   Resp 20   Ht 167.6 cm (65.98\")   Wt 77.9 kg (171 lb 11.2 " oz)   SpO2 100%   BMI 27.73 kg/m²     Body mass index is 27.73 kg/m².    All labs, imaging, test results, and specialty provider notes reviewed with patient.     Physical Exam  Vitals reviewed.   Constitutional:       Appearance: Normal appearance.   HENT:      Right Ear: Tympanic membrane and ear canal normal.      Left Ear: Tympanic membrane and ear canal normal.   Eyes:      Pupils: Pupils are equal, round, and reactive to light.   Cardiovascular:      Rate and Rhythm: Normal rate and regular rhythm.      Pulses: Normal pulses.      Heart sounds: Normal heart sounds.   Pulmonary:      Effort: Pulmonary effort is normal.      Breath sounds: Normal breath sounds.   Abdominal:      General: Abdomen is flat.      Palpations: Abdomen is soft. There is no mass.      Tenderness: There is no abdominal tenderness. There is no right CVA tenderness or left CVA tenderness.   Musculoskeletal:         General: Normal range of motion.      Cervical back: Normal range of motion.   Skin:     General: Skin is warm.   Neurological:      General: No focal deficit present.      Mental Status: He is alert and oriented to person, place, and time.   Psychiatric:         Mood and Affect: Mood normal.         Behavior: Behavior normal.              Assessment and Plan:  Diagnoses and all orders for this visit:    1. Seasonal allergic rhinitis, unspecified trigger (Primary)  Comments:  Likely causing itchiness in both ears, start on daily antihistamine over-the-counter.    2. Mixed hyperlipidemia  Comments:  Repeat blood work, continue on atorvastatin at current dose.  Goal LDL less than 130.  Orders:  -     Lipid Panel; Future    3. Gastroesophageal reflux disease without esophagitis  Comments:  Stable on medication, will refer patient for second opinion in [place].  Orders:  -     Ambulatory Referral to Gastroenterology    4. Vitamin D deficiency  -     Vitamin D,25-Hydroxy; Future    5. Routine lab draw  -     CBC Auto  Differential; Future  -     Comprehensive Metabolic Panel; Future  -     TSH Rfx On Abnormal To Free T4; Future  -     Urinalysis With Culture If Indicated -; Future          Follow Up:  No follow-ups on file.    Patient was given instructions and counseling regarding his condition or for health maintenance advice. Please see specific information pulled into the AVS if appropriate.

## 2023-07-13 PROBLEM — R42 VERTIGO: Status: ACTIVE | Noted: 2023-07-13

## 2023-09-10 RX ORDER — SODIUM CHLORIDE, SODIUM LACTATE, POTASSIUM CHLORIDE, CALCIUM CHLORIDE 600; 310; 30; 20 MG/100ML; MG/100ML; MG/100ML; MG/100ML
30 INJECTION, SOLUTION INTRAVENOUS CONTINUOUS
Status: CANCELLED | OUTPATIENT
Start: 2023-09-10

## 2023-09-11 ENCOUNTER — ANESTHESIA (OUTPATIENT)
Dept: GASTROENTEROLOGY | Facility: HOSPITAL | Age: 43
End: 2023-09-11
Payer: COMMERCIAL

## 2023-09-11 ENCOUNTER — ANESTHESIA EVENT (OUTPATIENT)
Dept: GASTROENTEROLOGY | Facility: HOSPITAL | Age: 43
End: 2023-09-11
Payer: COMMERCIAL

## 2023-09-11 ENCOUNTER — HOSPITAL ENCOUNTER (OUTPATIENT)
Facility: HOSPITAL | Age: 43
Setting detail: HOSPITAL OUTPATIENT SURGERY
Discharge: HOME | End: 2023-09-11
Attending: INTERNAL MEDICINE | Admitting: INTERNAL MEDICINE
Payer: COMMERCIAL

## 2023-09-11 VITALS
BODY MASS INDEX: 27 KG/M2 | WEIGHT: 167.99 LBS | HEIGHT: 66 IN | OXYGEN SATURATION: 97 % | TEMPERATURE: 97.6 F | RESPIRATION RATE: 16 BRPM | HEART RATE: 70 BPM | DIASTOLIC BLOOD PRESSURE: 72 MMHG | SYSTOLIC BLOOD PRESSURE: 108 MMHG

## 2023-09-11 DIAGNOSIS — K21.9 GASTROESOPHAGEAL REFLUX DISEASE, UNSPECIFIED WHETHER ESOPHAGITIS PRESENT: ICD-10-CM

## 2023-09-11 LAB — GLUCOSE BLDC GLUCOMTR-MCNC: 101 MG/DL (ref 70–99)

## 2023-09-11 PROCEDURE — 82948 REAGENT STRIP/BLOOD GLUCOSE: CPT

## 2023-09-11 PROCEDURE — 88305 TISSUE EXAM BY PATHOLOGIST: CPT | Performed by: INTERNAL MEDICINE

## 2023-09-11 PROCEDURE — 88342 IMHCHEM/IMCYTCHM 1ST ANTB: CPT | Performed by: INTERNAL MEDICINE

## 2023-09-11 PROCEDURE — 25010000002 PROPOFOL 10 MG/ML EMULSION: Performed by: NURSE ANESTHETIST, CERTIFIED REGISTERED

## 2023-09-11 RX ORDER — LIDOCAINE HYDROCHLORIDE 20 MG/ML
INJECTION, SOLUTION EPIDURAL; INFILTRATION; INTRACAUDAL; PERINEURAL AS NEEDED
Status: DISCONTINUED | OUTPATIENT
Start: 2023-09-11 | End: 2023-09-11 | Stop reason: SURG

## 2023-09-11 RX ORDER — PROPOFOL 10 MG/ML
VIAL (ML) INTRAVENOUS AS NEEDED
Status: DISCONTINUED | OUTPATIENT
Start: 2023-09-11 | End: 2023-09-11 | Stop reason: SURG

## 2023-09-11 RX ORDER — SODIUM CHLORIDE, SODIUM LACTATE, POTASSIUM CHLORIDE, CALCIUM CHLORIDE 600; 310; 30; 20 MG/100ML; MG/100ML; MG/100ML; MG/100ML
30 INJECTION, SOLUTION INTRAVENOUS CONTINUOUS
Status: DISCONTINUED | OUTPATIENT
Start: 2023-09-11 | End: 2023-09-11 | Stop reason: HOSPADM

## 2023-09-11 RX ORDER — PANTOPRAZOLE SODIUM 40 MG/1
40 TABLET, DELAYED RELEASE ORAL DAILY
Qty: 30 TABLET | Refills: 5 | Status: SHIPPED | OUTPATIENT
Start: 2023-09-11

## 2023-09-11 RX ADMIN — SODIUM CHLORIDE, POTASSIUM CHLORIDE, SODIUM LACTATE AND CALCIUM CHLORIDE 30 ML/HR: 600; 310; 30; 20 INJECTION, SOLUTION INTRAVENOUS at 07:18

## 2023-09-11 RX ADMIN — LIDOCAINE HYDROCHLORIDE 50 MG: 20 INJECTION, SOLUTION EPIDURAL; INFILTRATION; INTRACAUDAL; PERINEURAL at 08:22

## 2023-09-11 RX ADMIN — PROPOFOL 100 MG: 10 INJECTION, EMULSION INTRAVENOUS at 08:22

## 2023-09-11 RX ADMIN — PROPOFOL 175 MCG/KG/MIN: 10 INJECTION, EMULSION INTRAVENOUS at 08:22

## 2023-09-11 NOTE — ANESTHESIA PREPROCEDURE EVALUATION
Anesthesia Evaluation     Patient summary reviewed and Nursing notes reviewed   no history of anesthetic complications:   NPO Solid Status: > 8 hours  NPO Liquid Status: > 2 hours           Airway   Mallampati: II  TM distance: >3 FB  Neck ROM: full  No difficulty expected  Dental - normal exam     Pulmonary - negative pulmonary ROS and normal exam    breath sounds clear to auscultation  Cardiovascular - normal exam  Exercise tolerance: good (4-7 METS)    Rhythm: regular  Rate: normal    (+) hyperlipidemia      Neuro/Psych  (+) dizziness/light headedness, psychiatric history  GI/Hepatic/Renal/Endo    (+) GERD    Musculoskeletal (-) negative ROS    Abdominal    Substance History - negative use     OB/GYN negative ob/gyn ROS         Other - negative ROS       ROS/Med Hx Other: PAT Nursing Notes unavailable.                   Anesthesia Plan    ASA 2     general     (Total IV Anesthesia    Patient understands anesthesia not responsible for dental damage.  )  intravenous induction     Anesthetic plan, risks, benefits, and alternatives have been provided, discussed and informed consent has been obtained with: patient.    Plan discussed with CRNA.      CODE STATUS:

## 2023-09-11 NOTE — H&P
"Pre Procedure History & Physical    Chief Complaint:   Heartburn    Subjective     HPI:   Heartburn history of H. pylori infection    Past Medical History:   Past Medical History:   Diagnosis Date    Anxiety     GERD (gastroesophageal reflux disease)     H. pylori infection 2021    Hyperlipidemia        Past Surgical History:  Past Surgical History:   Procedure Laterality Date    COLONOSCOPY      2 YRS       Family History:  Family History   Problem Relation Age of Onset    Colon cancer Father     Cancer Father        Social History:   reports that he has never smoked. He has never used smokeless tobacco. He reports that he does not drink alcohol and does not use drugs.    Medications:   Medications Prior to Admission   Medication Sig Dispense Refill Last Dose    atorvastatin (LIPITOR) 10 MG tablet Take 1 tablet by mouth Every Evening.   9/10/2023    Cholecalciferol (Vitamin D3) 50 MCG (2000 UT) capsule Take 1 capsule by mouth Daily.   9/10/2023    meclizine (ANTIVERT) 25 MG tablet Take 1 tablet by mouth 3 (Three) Times a Day As Needed for Dizziness. 90 tablet 2 Past Month    omeprazole (priLOSEC) 40 MG capsule Take 1 capsule by mouth Daily. 90 capsule 1 9/10/2023       Allergies:  Patient has no known allergies.        Objective     Blood pressure 116/78, pulse 70, temperature 97.9 °F (36.6 °C), temperature source Temporal, resp. rate 20, height 167.6 cm (66\"), weight 76.2 kg (167 lb 15.9 oz), SpO2 97 %.    Physical Exam   Constitutional: Pt is oriented to person, place, and time and well-developed, well-nourished, and in no distress.   Mouth/Throat: Oropharynx is clear and moist.   Neck: Normal range of motion.   Cardiovascular: Normal rate, regular rhythm and normal heart sounds.    Pulmonary/Chest: Effort normal and breath sounds normal.   Abdominal: Soft. Nontender  Skin: Skin is warm and dry.   Psychiatric: Mood, memory, affect and judgment normal.     Assessment & Plan     Diagnosis:  Heartburn    Anticipated " Surgical Procedure:  EGD    The risks, benefits, and alternatives of this procedure have been discussed with the patient or the responsible party- the patient understands and agrees to proceed.

## 2023-09-11 NOTE — NURSING NOTE
PATIENT C/O DIZZINESS AND WEAKNESS. /82. BLOOD SUGAR 101. ANESTHESIA NOTIFIED. HOB LOWERED. COMFORT MEASURES. IV FLUIDS OPEN.  PATIENT REPORTS HISTORY OF VERTIGO.

## 2023-09-11 NOTE — NURSING NOTE
PATIENT REPORTS IMPROVEMENT IN SYMPTOMS. INSTRUCTED NOT TO GET OUT OF BED WITHOUT ASSISTANCE & TO REPORT ANY FURTHER SYMPTOMS.

## 2023-09-11 NOTE — ANESTHESIA POSTPROCEDURE EVALUATION
Patient: Missael Maloney    Procedure Summary       Date: 09/11/23 Room / Location: [facility] ENDOSCOPY 4 / [facility] ENDOSCOPY    Anesthesia Start: 0819 Anesthesia Stop: 0846    Procedure: ESOPHAGOGASTRODUODENOSCOPY with biopsies Diagnosis:       Gastroesophageal reflux disease, unspecified whether esophagitis present      (Gastroesophageal reflux disease, unspecified whether esophagitis present [K21.9])    Surgeons: Luis Brown MD Provider: Jose Vasquez CRNA    Anesthesia Type: general ASA Status: 2            Anesthesia Type: general    Vitals  Vitals Value Taken Time   BP 99/60 09/11/23 0914   Temp 36.4 °C (97.6 °F) 09/11/23 0846   Pulse 79 09/11/23 0915   Resp 16 09/11/23 0913   SpO2 97 % 09/11/23 0915   Vitals shown include unvalidated device data.        Post Anesthesia Care and Evaluation    Post-procedure mental status: acceptable.  Pain management: satisfactory to patient    Airway patency: patent  Anesthetic complications: No anesthetic complications    Cardiovascular status: acceptable  Respiratory status: acceptable    Comments: Per chart review

## 2023-09-12 LAB
CYTO UR: NORMAL
LAB AP CASE REPORT: NORMAL
LAB AP CLINICAL INFORMATION: NORMAL
LAB AP SPECIAL STAINS: NORMAL
PATH REPORT.FINAL DX SPEC: NORMAL
PATH REPORT.GROSS SPEC: NORMAL

## 2023-09-22 ENCOUNTER — OFFICE VISIT (OUTPATIENT)
Dept: GASTROENTEROLOGY | Facility: CLINIC | Age: 43
End: 2023-09-22
Payer: COMMERCIAL

## 2023-09-22 VITALS
HEIGHT: 66 IN | BODY MASS INDEX: 26.68 KG/M2 | OXYGEN SATURATION: 100 % | SYSTOLIC BLOOD PRESSURE: 125 MMHG | WEIGHT: 166 LBS | HEART RATE: 86 BPM | DIASTOLIC BLOOD PRESSURE: 73 MMHG

## 2023-09-22 DIAGNOSIS — Z80.0 FAMILY HISTORY OF COLON CANCER IN FATHER: ICD-10-CM

## 2023-09-22 DIAGNOSIS — Z86.19 HISTORY OF HELICOBACTER PYLORI INFECTION: ICD-10-CM

## 2023-09-22 DIAGNOSIS — K21.9 GASTROESOPHAGEAL REFLUX DISEASE, UNSPECIFIED WHETHER ESOPHAGITIS PRESENT: Primary | ICD-10-CM

## 2023-09-22 NOTE — PROGRESS NOTES
Chief Complaint  EGD follow up     Missael Maloney is a 43 y.o. male who presents to [facility] for EGD follow up     History of present Illness  Establish care through  for dyspepsia   EGD 09/11/2023 by Dr. 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  Esophageal and duodenal biopsies normal.  Stomach biopsies-mild chronic and focal active gastritis.    Patient presents to the office for EGD follow up.  Reflux symptoms are well controlled when taking Protonix 40 mg daily.  Patient cannot skip a dose or has return of symptoms.  No longer experiencing breakthrough heartburn or epigastric pain.  Denies lower GI symptoms such as change in bowel habits, constipation, diarrhea, melena, and hematochezia.  Family history of colon cancer in his father.  Last colonoscopy at age 41 which was normal.    Past Medical History:   Diagnosis Date    Anxiety     GERD (gastroesophageal reflux disease)     H. pylori infection 2021    Hyperlipidemia        Past Surgical History:   Procedure Laterality Date    COLONOSCOPY      2 YRS    ENDOSCOPY N/A 09/11/2023    Procedure: ESOPHAGOGASTRODUODENOSCOPY with biopsies;  Surgeon: Luis Brown MD;  [facility];  Service: Gastroenterology;  Laterality: N/A;  gastric inlet patch, hiatal hernia    UPPER GASTROINTESTINAL ENDOSCOPY           Current Outpatient Medications:     atorvastatin (LIPITOR) 10 MG tablet, Take 1 tablet by mouth Every Evening., Disp: , Rfl:     Cholecalciferol (Vitamin D3) 50 MCG (2000 UT) capsule, Take 1 capsule by mouth Daily., Disp: , Rfl:     meclizine (ANTIVERT) 25 MG tablet, Take 1 tablet by mouth 3 (Three) Times a Day As Needed for Dizziness., Disp: 90 tablet, Rfl: 2    pantoprazole (PROTONIX) 40 MG EC tablet, Take 1 tablet by mouth Daily., Disp: 30 tablet, Rfl: 5     No Known Allergies    Family History   Problem  "Relation Age of Onset    Colon cancer Father     Cancer Father         Social History     Social History Narrative    Not on file       Objective       Vital Signs:   /73 (BP Location: Left arm, Patient Position: Sitting, Cuff Size: Adult)   Pulse 86   Ht 167.6 cm (65.98\")   Wt 75.3 kg (166 lb)   SpO2 100%   BMI 26.81 kg/m²       Physical Exam  Constitutional:       Appearance: Normal appearance.   HENT:      Head: Normocephalic.   Cardiovascular:      Rate and Rhythm: Normal rate and regular rhythm.      Heart sounds: Normal heart sounds.   Pulmonary:      Effort: Pulmonary effort is normal.      Breath sounds: Normal breath sounds.   Abdominal:      General: Bowel sounds are normal.      Palpations: Abdomen is soft.   Skin:     General: Skin is warm and dry.   Neurological:      Mental Status: He is alert and oriented to person, place, and time. Mental status is at baseline.   Psychiatric:         Mood and Affect: Mood normal.         Behavior: Behavior normal.         Thought Content: Thought content normal.         Judgment: Judgment normal.       Result Review :       CBC w/diff          11/13/2022    17:24 1/6/2023    18:59 4/27/2023    07:47   CBC w/Diff   WBC 6.79  6.70  7.83    RBC 5.48  5.44  5.47    Hemoglobin 15.3  15.0  14.8    Hematocrit 45.0  45.2  44.4    MCV 82.1  83.1  81.2    MCH 27.9  27.6  27.1    MCHC 34.0  33.2  33.3    RDW 13.2  13.1  13.3    Platelets 245  232  210    Neutrophil Rel % 61.0  60.3  42.5    Immature Granulocyte Rel % 0.3  0.1  0.4    Lymphocyte Rel % 31.2  29.3  30.1    Monocyte Rel % 5.4  8.1  5.6    Eosinophil Rel % 1.8  1.8  21.1    Basophil Rel % 0.3  0.4  0.3      CMP          11/13/2022    17:24 1/6/2023    18:59 4/27/2023    07:47   CMP   Glucose 83  82  105    BUN 8  10  13    Creatinine 0.79  0.94  1.19    EGFR 114.5  104.4  78.7    Sodium 141  140  139    Potassium 4.1  3.5  4.0    Chloride 105  104  103    Calcium 9.1  9.2  9.4    Total Protein 7.5  7.5  " 7.2    Albumin 4.60  4.6  4.3    Globulin 2.9  2.9  2.9    Total Bilirubin 0.3  0.3  0.3    Alkaline Phosphatase 82  76  93    AST (SGOT) 23  17  16    ALT (SGPT) 42  25  20    Albumin/Globulin Ratio 1.6  1.6  1.5    BUN/Creatinine Ratio 10.1  10.6  10.9    Anion Gap 8.0  7.8  9.8              Assessment and Plan    Diagnoses and all orders for this visit:    1. Gastroesophageal reflux disease, unspecified whether esophagitis present (Primary)    2. History of Helicobacter pylori infection    3. Family history of colon cancer in father    Reviewed most recent EGD and pathology report with the patient.  Okay to continue Protonix 40 mg as is adequately controlling symptoms.  Patient may attempt to wean Protonix to 20 mg daily after 1 month.  Patient placed in colonoscopy recall for age 46 due to family history of colon cancer.  Overall patient is doing very well and will call the office if he has any GI complaints.    Follow Up   No follow-ups on file.  Patient was given instructions and counseling regarding his condition or for health maintenance advice. Please see specific information pulled into the AVS if appropriate.

## 2023-09-26 RX ORDER — ATORVASTATIN CALCIUM 10 MG/1
10 TABLET, FILM COATED ORAL EVERY EVENING
Qty: 90 TABLET | Refills: 1 | Status: SHIPPED | OUTPATIENT
Start: 2023-09-26

## 2023-09-26 NOTE — TELEPHONE ENCOUNTER
----- Message from Missael Maloney sent at 9/25/2023  9:11 PM EDT -----  Regarding: Refill request for Atorvastatin  Contact: [phone]  Hi,  Hope you are doing well. I am recently out of Atorvastatin and need to refill that.  I would appreciate if you can send the prescription to the [facility] pharmacy in [facility].   Please let me know if you have any question.

## 2023-10-12 ENCOUNTER — OFFICE VISIT (OUTPATIENT)
Dept: FAMILY MEDICINE CLINIC | Facility: CLINIC | Age: 43
End: 2023-10-12
Payer: COMMERCIAL

## 2023-10-12 VITALS
BODY MASS INDEX: 27.29 KG/M2 | HEART RATE: 86 BPM | HEIGHT: 66 IN | SYSTOLIC BLOOD PRESSURE: 128 MMHG | DIASTOLIC BLOOD PRESSURE: 78 MMHG | WEIGHT: 169.8 LBS | TEMPERATURE: 98.1 F | OXYGEN SATURATION: 99 %

## 2023-10-12 DIAGNOSIS — Z23 NEED FOR COVID-19 VACCINE: ICD-10-CM

## 2023-10-12 DIAGNOSIS — Z13.220 SCREENING FOR LIPID DISORDERS: ICD-10-CM

## 2023-10-12 DIAGNOSIS — K21.9 GASTROESOPHAGEAL REFLUX DISEASE WITHOUT ESOPHAGITIS: ICD-10-CM

## 2023-10-12 DIAGNOSIS — Z00.00 ANNUAL PHYSICAL EXAM: Primary | ICD-10-CM

## 2023-10-12 DIAGNOSIS — J30.2 SEASONAL ALLERGIC RHINITIS, UNSPECIFIED TRIGGER: ICD-10-CM

## 2023-10-12 DIAGNOSIS — L91.8 SKIN TAG: ICD-10-CM

## 2023-10-12 DIAGNOSIS — F41.9 ANXIETY: ICD-10-CM

## 2023-10-12 DIAGNOSIS — Z23 NEED FOR INFLUENZA VACCINATION: ICD-10-CM

## 2023-10-12 DIAGNOSIS — Z11.59 ENCOUNTER FOR HEPATITIS C SCREENING TEST FOR LOW RISK PATIENT: ICD-10-CM

## 2023-10-12 RX ORDER — FEXOFENADINE HCL 180 MG/1
180 TABLET ORAL DAILY
Qty: 90 TABLET | Refills: 3 | Status: SHIPPED | OUTPATIENT
Start: 2023-10-12

## 2023-10-12 NOTE — PROGRESS NOTES
Missael Mlaoney presents to [facility] with complaints of itching, rash, skin tags, anxiety/tension, and is also here for annual physical.      History of Present Illness  This is a 43-year-old male who presents to the clinic with complaints of itching, rash, skin tags, anxiety/tension, and is also here for annual physical.    Itching/allergies: Patient states that he is noticed over the past several weeks that has been having some issues with just generalized itching.  States there is no one location that is worse than another, but is just generalized itchy.  Patient states that he did stay in a hotel, was found out that later that there was bedbugs associated with that, and he is not sure if the itching is just in his head or what could be causing it.  Has not had any bugs, no bug bites, nothing like that, just want to make sure that he is treating what ever is needed for the itchiness.  Is also been having some issues with some inflammation in his ears, states that he is doing okay as far as the vertigo goes, but that he does have some issues with just internal ear itching as well.    Skin tags: Would like to see dermatology in regards to removal of these on his chin, has had them removed before, but they have come back.    Anxiety: Patient states that he just feels really tense often, does have a lot of anxiety, and is wanting to talk to a counselor/therapist and possibly psychiatrist in regards to this.  Denies any depression, no suicidal thoughts or ideation.  Feels as though he carries his stress in his neck and shoulders.    Patient also recently had upper scope performed, they did find gastritis, and told him to take his pantoprazole for the next few months and then wean off of this.  Patient states that he does not feel like it is working that well for him, that mainly his issue is diet, and is going to work on his diet.  Is really reassured that there is nothing  "else more concerning that is causing it, and what she thinks is helped a lot as well.    We will order blood work, otherwise up-to-date on preventative screenings.    The following portions of the patient's history were personally reviewed and updated as appropriate: allergies, current medications, past medical history, past surgical history, past family history, and past social history.       Objective   Vital Signs:   /78 (BP Location: Left arm, Patient Position: Sitting, Cuff Size: Adult)   Pulse 86   Temp 98.1 øF (36.7 øC) (Temporal)   Ht 167.6 cm (65.98\")   Wt 77 kg (169 lb 12.8 oz)   SpO2 99%   BMI 27.42 kg/mý     Body mass index is 27.42 kg/mý.    All labs, imaging, test results, and specialty provider notes reviewed with patient.     Physical Exam  Vitals reviewed.   Constitutional:       Appearance: Normal appearance.   Cardiovascular:      Rate and Rhythm: Normal rate and regular rhythm.      Pulses: Normal pulses.      Heart sounds: Normal heart sounds.   Pulmonary:      Effort: Pulmonary effort is normal.      Breath sounds: Normal breath sounds.   Neurological:      General: No focal deficit present.      Mental Status: He is alert and oriented to person, place, and time.                  BMI is >= 25 and <30. (Overweight) The following options were offered after discussion;: exercise counseling/recommendations and nutrition counseling/recommendations            Assessment and Plan:  Diagnoses and all orders for this visit:    1. Annual physical exam (Primary)  -     CBC Auto Differential; Future  -     Comprehensive Metabolic Panel; Future  -     TSH Rfx On Abnormal To Free T4; Future  -     Urinalysis With Culture If Indicated -; Future    2. Need for influenza vaccination  -     Fluzone >6 Months (5813-8743)    3. Need for COVID-19 vaccine  -     COVID-19 F23 (Pfizer) 12yrs+ (COMIRNATY)    4. Seasonal allergic rhinitis, unspecified trigger  Comments:  Started Allegra, likely cause of " rash/itchiness, and ear itchiness as well.  Orders:  -     fexofenadine (Allegra Allergy) 180 MG tablet; Take 1 tablet by mouth Daily.  Dispense: 90 tablet; Refill: 3    5. Gastroesophageal reflux disease without esophagitis  Comments:  Per GI.    6. Anxiety  Comments:  Refer to psychiatry for counseling/therapy and evaluation.  Per patient request.  Orders:  -     Ambulatory Referral to Psychiatry    7. Encounter for hepatitis C screening test for low risk patient  -     Hepatitis C antibody; Future    8. Screening for lipid disorders  -     Lipid Panel; Future    9. Skin tag  Comments:  Referral to dermatology for removal.  Orders:  -     Ambulatory Referral to Dermatology    Anticipatory Guidelines discussed with patient.    Discussed getting adequate exercise, reduced TV/electronic time, car safety including seat belt use, sexual activity (if applicable), and smoking/alcohol use (if applicable).      Follow Up:  No follow-ups on file.    Patient was given instructions and counseling regarding his condition or for health maintenance advice. Please see specific information pulled into the AVS if appropriate.

## 2023-12-07 ENCOUNTER — OFFICE VISIT (OUTPATIENT)
Dept: FAMILY MEDICINE CLINIC | Facility: CLINIC | Age: 43
End: 2023-12-07
Payer: COMMERCIAL

## 2023-12-07 VITALS
SYSTOLIC BLOOD PRESSURE: 137 MMHG | BODY MASS INDEX: 27.93 KG/M2 | WEIGHT: 173.8 LBS | TEMPERATURE: 98 F | DIASTOLIC BLOOD PRESSURE: 83 MMHG | HEART RATE: 89 BPM | OXYGEN SATURATION: 99 % | RESPIRATION RATE: 21 BRPM | HEIGHT: 66 IN

## 2023-12-07 DIAGNOSIS — J01.00 ACUTE NON-RECURRENT MAXILLARY SINUSITIS: ICD-10-CM

## 2023-12-07 DIAGNOSIS — J06.9 UPPER RESPIRATORY TRACT INFECTION, UNSPECIFIED TYPE: Primary | ICD-10-CM

## 2023-12-07 PROCEDURE — 99213 OFFICE O/P EST LOW 20 MIN: CPT | Performed by: STUDENT IN AN ORGANIZED HEALTH CARE EDUCATION/TRAINING PROGRAM

## 2023-12-07 RX ORDER — AZITHROMYCIN 250 MG/1
TABLET, FILM COATED ORAL
Qty: 6 TABLET | Refills: 0 | Status: CANCELLED | OUTPATIENT
Start: 2023-12-07

## 2023-12-07 RX ORDER — BROMPHENIRAMINE MALEATE, PSEUDOEPHEDRINE HYDROCHLORIDE, AND DEXTROMETHORPHAN HYDROBROMIDE 2; 30; 10 MG/5ML; MG/5ML; MG/5ML
5 SYRUP ORAL 4 TIMES DAILY PRN
Qty: 118 ML | Refills: 0 | Status: SHIPPED | OUTPATIENT
Start: 2023-12-07

## 2023-12-07 RX ORDER — PREDNISONE 20 MG/1
20 TABLET ORAL DAILY
Qty: 5 TABLET | Refills: 0 | Status: SHIPPED | OUTPATIENT
Start: 2023-12-07

## 2023-12-07 RX ORDER — AMOXICILLIN AND CLAVULANATE POTASSIUM 875; 125 MG/1; MG/1
1 TABLET, FILM COATED ORAL 2 TIMES DAILY
Qty: 20 TABLET | Refills: 0 | Status: SHIPPED | OUTPATIENT
Start: 2023-12-07

## 2023-12-07 NOTE — PROGRESS NOTES
"Chief Complaint  Cough congestion and sinus drainage    Subjective         Missael Maloney is a 43 y.o. male who presents to [facility]    43 years old comes to the clinic today for an acute visit.    Almost 10 days active symptoms of cough, congestion, sinus drainage/sinus pressure without any fever.  Positive sick contact at home, wife was having same symptoms.  Denies any chest pain or shortness of breath but does report lots of coughing and frontal headaches due to sinus pressure and drainage.  Patient used some over-the-counter cough medication which help very minimal.  Denies any recent travel or any GI symptoms such as anosmia/diarrhea/constipation or any abdominal pain.  Reports no wheezing or really shortness of breath, still staying active.    Objective   Vital Signs:   Vitals:    12/07/23 1437   BP: 137/83   Pulse: 89   Resp: 21   Temp: 98 °F (36.7 °C)   SpO2: 99%   Weight: 78.8 kg (173 lb 12.8 oz)   Height: 167.6 cm (65.98\")      Body mass index is 28.07 kg/m².   Wt Readings from Last 3 Encounters:   12/07/23 78.8 kg (173 lb 12.8 oz)   10/12/23 77 kg (169 lb 12.8 oz)   09/22/23 75.3 kg (166 lb)      BP Readings from Last 3 Encounters:   12/07/23 137/83   10/12/23 128/78   09/22/23 125/73        Patient Care Team:  Danielle Esteban APRN as PCP - General (Nurse Practitioner)     Physical Exam  HENT:      Right Ear: Swelling present.      Left Ear: Swelling present.      Mouth/Throat:      Pharynx: Pharyngeal swelling and posterior oropharyngeal erythema present.      Comments: Yellow sinus drainage  Pulmonary:      Effort: Pulmonary effort is normal.      Breath sounds: Normal breath sounds.                            Assessment and Plan   Diagnoses and all orders for this visit:    1. Upper respiratory tract infection, unspecified type (Primary)  -     amoxicillin-clavulanate (AUGMENTIN) 875-125 MG per tablet; Take 1 tablet by mouth 2 (Two) Times a Day.  " Dispense: 20 tablet; Refill: 0  -     predniSONE (DELTASONE) 20 MG tablet; Take 1 tablet by mouth Daily.  Dispense: 5 tablet; Refill: 0  -     brompheniramine-pseudoephedrine-DM 30-2-10 MG/5ML syrup; Take 5 mL by mouth 4 (Four) Times a Day As Needed for Cough or Congestion.  Dispense: 118 mL; Refill: 0    2. Acute non-recurrent maxillary sinusitis  -     amoxicillin-clavulanate (AUGMENTIN) 875-125 MG per tablet; Take 1 tablet by mouth 2 (Two) Times a Day.  Dispense: 20 tablet; Refill: 0  -     predniSONE (DELTASONE) 20 MG tablet; Take 1 tablet by mouth Daily.  Dispense: 5 tablet; Refill: 0  -     brompheniramine-pseudoephedrine-DM 30-2-10 MG/5ML syrup; Take 5 mL by mouth 4 (Four) Times a Day As Needed for Cough or Congestion.  Dispense: 118 mL; Refill: 0      Rapid swabs offered but declined by patient.    We will treat empirically with Augmentin/prednisone and Bromfed.    Patient to call if not better in 2 to 3 days as this may require further evaluation/management and treatment plan.    Tobacco Use: Low Risk  (12/7/2023)    Patient History     Smoking Tobacco Use: Never     Smokeless Tobacco Use: Never     Passive Exposure: Never            Follow Up   Return if symptoms worsen or fail to improve.  Patient was given instructions and counseling regarding his condition or for health maintenance advice. Please see specific information pulled into the AVS if appropriate.

## 2023-12-08 ENCOUNTER — LAB (OUTPATIENT)
Dept: LAB | Facility: HOSPITAL | Age: 43
End: 2023-12-08
Payer: COMMERCIAL

## 2023-12-08 DIAGNOSIS — Z11.59 ENCOUNTER FOR HEPATITIS C SCREENING TEST FOR LOW RISK PATIENT: ICD-10-CM

## 2023-12-08 DIAGNOSIS — Z13.220 SCREENING FOR LIPID DISORDERS: ICD-10-CM

## 2023-12-08 DIAGNOSIS — Z00.00 ANNUAL PHYSICAL EXAM: ICD-10-CM

## 2023-12-08 LAB
ALBUMIN SERPL-MCNC: 4.6 G/DL (ref 3.5–5.2)
ALBUMIN/GLOB SERPL: 1.7 G/DL
ALP SERPL-CCNC: 94 U/L (ref 39–117)
ALT SERPL W P-5'-P-CCNC: 30 U/L (ref 1–41)
ANION GAP SERPL CALCULATED.3IONS-SCNC: 8 MMOL/L (ref 5–15)
AST SERPL-CCNC: 22 U/L (ref 1–40)
BASOPHILS # BLD AUTO: 0.03 10*3/MM3 (ref 0–0.2)
BASOPHILS NFR BLD AUTO: 0.5 % (ref 0–1.5)
BILIRUB SERPL-MCNC: 0.5 MG/DL (ref 0–1.2)
BILIRUB UR QL STRIP: NEGATIVE
BUN SERPL-MCNC: 10 MG/DL (ref 6–20)
BUN/CREAT SERPL: 10.1 (ref 7–25)
CALCIUM SPEC-SCNC: 9.4 MG/DL (ref 8.6–10.5)
CHLORIDE SERPL-SCNC: 104 MMOL/L (ref 98–107)
CHOLEST SERPL-MCNC: 162 MG/DL (ref 0–200)
CLARITY UR: CLEAR
CO2 SERPL-SCNC: 27 MMOL/L (ref 22–29)
COLOR UR: YELLOW
CREAT SERPL-MCNC: 0.99 MG/DL (ref 0.76–1.27)
DEPRECATED RDW RBC AUTO: 39.1 FL (ref 37–54)
EGFRCR SERPLBLD CKD-EPI 2021: 97.5 ML/MIN/1.73
EOSINOPHIL # BLD AUTO: 0.21 10*3/MM3 (ref 0–0.4)
EOSINOPHIL NFR BLD AUTO: 3.3 % (ref 0.3–6.2)
ERYTHROCYTE [DISTWIDTH] IN BLOOD BY AUTOMATED COUNT: 13.1 % (ref 12.3–15.4)
GLOBULIN UR ELPH-MCNC: 2.7 GM/DL
GLUCOSE SERPL-MCNC: 108 MG/DL (ref 65–99)
GLUCOSE UR STRIP-MCNC: NEGATIVE MG/DL
HCT VFR BLD AUTO: 46.6 % (ref 37.5–51)
HCV AB SER DONR QL: NORMAL
HDLC SERPL-MCNC: 30 MG/DL (ref 40–60)
HGB BLD-MCNC: 15.5 G/DL (ref 13–17.7)
HGB UR QL STRIP.AUTO: NEGATIVE
HOLD SPECIMEN: NORMAL
IMM GRANULOCYTES # BLD AUTO: 0.02 10*3/MM3 (ref 0–0.05)
IMM GRANULOCYTES NFR BLD AUTO: 0.3 % (ref 0–0.5)
KETONES UR QL STRIP: NEGATIVE
LDLC SERPL CALC-MCNC: 98 MG/DL (ref 0–100)
LDLC/HDLC SERPL: 3.09 {RATIO}
LEUKOCYTE ESTERASE UR QL STRIP.AUTO: NEGATIVE
LYMPHOCYTES # BLD AUTO: 2.41 10*3/MM3 (ref 0.7–3.1)
LYMPHOCYTES NFR BLD AUTO: 37.6 % (ref 19.6–45.3)
MCH RBC QN AUTO: 27.5 PG (ref 26.6–33)
MCHC RBC AUTO-ENTMCNC: 33.3 G/DL (ref 31.5–35.7)
MCV RBC AUTO: 82.8 FL (ref 79–97)
MONOCYTES # BLD AUTO: 0.48 10*3/MM3 (ref 0.1–0.9)
MONOCYTES NFR BLD AUTO: 7.5 % (ref 5–12)
NEUTROPHILS NFR BLD AUTO: 3.26 10*3/MM3 (ref 1.7–7)
NEUTROPHILS NFR BLD AUTO: 50.8 % (ref 42.7–76)
NITRITE UR QL STRIP: NEGATIVE
NRBC BLD AUTO-RTO: 0 /100 WBC (ref 0–0.2)
PH UR STRIP.AUTO: 6 [PH] (ref 5–8)
PLATELET # BLD AUTO: 232 10*3/MM3 (ref 140–450)
PMV BLD AUTO: 10.7 FL (ref 6–12)
POTASSIUM SERPL-SCNC: 4.3 MMOL/L (ref 3.5–5.2)
PROT SERPL-MCNC: 7.3 G/DL (ref 6–8.5)
PROT UR QL STRIP: NEGATIVE
RBC # BLD AUTO: 5.63 10*6/MM3 (ref 4.14–5.8)
SODIUM SERPL-SCNC: 139 MMOL/L (ref 136–145)
SP GR UR STRIP: 1.02 (ref 1–1.03)
T4 FREE SERPL-MCNC: 1.12 NG/DL (ref 0.93–1.7)
TRIGL SERPL-MCNC: 196 MG/DL (ref 0–150)
TSH SERPL DL<=0.05 MIU/L-ACNC: 4.5 UIU/ML (ref 0.27–4.2)
UROBILINOGEN UR QL STRIP: NORMAL
VLDLC SERPL-MCNC: 34 MG/DL (ref 5–40)
WBC NRBC COR # BLD AUTO: 6.41 10*3/MM3 (ref 3.4–10.8)

## 2023-12-08 PROCEDURE — 80050 GENERAL HEALTH PANEL: CPT

## 2023-12-08 PROCEDURE — 36415 COLL VENOUS BLD VENIPUNCTURE: CPT

## 2023-12-08 PROCEDURE — 80061 LIPID PANEL: CPT

## 2023-12-08 PROCEDURE — 86803 HEPATITIS C AB TEST: CPT

## 2023-12-08 PROCEDURE — 81003 URINALYSIS AUTO W/O SCOPE: CPT

## 2023-12-08 PROCEDURE — 84439 ASSAY OF FREE THYROXINE: CPT

## 2023-12-11 ENCOUNTER — OFFICE VISIT (OUTPATIENT)
Dept: PSYCHIATRY | Facility: CLINIC | Age: 43
End: 2023-12-11
Payer: COMMERCIAL

## 2023-12-11 VITALS
HEART RATE: 96 BPM | HEIGHT: 66 IN | BODY MASS INDEX: 28.09 KG/M2 | WEIGHT: 174.8 LBS | SYSTOLIC BLOOD PRESSURE: 133 MMHG | DIASTOLIC BLOOD PRESSURE: 82 MMHG

## 2023-12-11 DIAGNOSIS — R79.89 ELEVATED TSH: Primary | ICD-10-CM

## 2023-12-11 DIAGNOSIS — F41.1 GENERALIZED ANXIETY DISORDER: Primary | ICD-10-CM

## 2023-12-11 PROCEDURE — 90792 PSYCH DIAG EVAL W/MED SRVCS: CPT | Performed by: STUDENT IN AN ORGANIZED HEALTH CARE EDUCATION/TRAINING PROGRAM

## 2023-12-11 NOTE — PROGRESS NOTES
"Subjective   Missael Julissa Maloney is a 43 y.o. male who presents today for initial evaluation     Referring Provider:  Danielle Esteban, APRN  [address]  [place] [address] [place] [id number]    Chief Complaint:  Anxiety    History of Present Illness:     Chart review:     Noam: blank  Care Everywhere: several notes, none for beh health    Psychotropic medication chart review:  Present:  None    Previously:  None    EK/23: 77, sinus, qtc 434  Procedures: none  Head imaging: none  Labs:  : gluc 108 otherwise reassuring cmp/cbc. Tsh elev 4.5, fT4 reassuring. High trigs, low hdl.       Chart notes: Seen 10/23. Anxious, wants to speak with a counselor, psychiatrist (possibly). Referred to us.       \"[place]\"  Patient Psychotherapy Notes:  Patient goals:  Misc:      In person.  Interview:  Chart review:   His/Her Story: \"I got some complaint about body tics.\"  P10, G3  I also clench my jaws  I also worry, and anxiety  I tried yoga, meditation  I'd like to avoid medicine first, and try psychotherapy  Mom had possible OCD, cured with medication  I like my job, family  I don't like living in [place], [place] small town. I'm used to a big city.  I never had to worry about money before (in [place]). Maybe it's because of the little one.  Worries:  Financial  His child: her future, how he is raising her  If something happens to us -- what happens to her?  Depression/Mood: minimal  Anxiety:  Uncontrolled worrying, muscle tension, fatigue, feeling on edge or restless, irritability.  Severity: mild to moderate  Duration: since 2017, when moved to [place] from [place]  Panic attacks: n  Psych ROS: Positive for depression, anxiety.  Negative for psychosis and nael.  ADHD: def  PTSD: def  No SI HI AVH.  Medication compliant: y      Access to Firearms: denies    PHQ-9 Depression Screening  PHQ-9 Total Score: 3    Little interest or pleasure in doing things? 1-->several days   Feeling down, depressed, or hopeless? 1-->several " days   Trouble falling or staying asleep, or sleeping too much? 0-->not at all   Feeling tired or having little energy? 0-->not at all   Poor appetite or overeating? 1-->several days   Feeling bad about yourself - or that you are a failure or have let yourself or your family down? 0-->not at all   Trouble concentrating on things, such as reading the newspaper or watching television? 0-->not at all   Moving or speaking so slowly that other people could have noticed? Or the opposite - being so fidgety or restless that you have been moving around a lot more than usual? 0-->not at all   Thoughts that you would be better off dead, or of hurting yourself in some way? 0-->not at all   PHQ-9 Total Score 3     THOMAS-7  Feeling nervous, anxious or on edge: More than half the days  Not being able to stop or control worrying: More than half the days  Worrying too much about different things: More than half the days  Trouble Relaxing: Several days  Being so restless that it is hard to sit still: Not at all  Feeling afraid as if something awful might happen: More than half the days  Becoming easily annoyed or irritable: Several days  THOMAS 7 Total Score: 10  If you checked any problems, how difficult have these problems made it for you to do your work, take care of things at home, or get along with other people: Not difficult at all    Past Surgical History:  Past Surgical History:   Procedure Laterality Date    COLONOSCOPY      2 YRS    ENDOSCOPY N/A 09/11/2023    Procedure: ESOPHAGOGASTRODUODENOSCOPY with biopsies;  Surgeon: Luis Brown MD;  Location: [facility] ENDOSCOPY;  Service: Gastroenterology;  Laterality: N/A;  gastric inlet patch, hiatal hernia    UPPER GASTROINTESTINAL ENDOSCOPY         Problem List:  Patient Active Problem List   Diagnosis    Mixed hyperlipidemia    Gastroesophageal reflux disease without esophagitis    History of Helicobacter pylori infection    Family history of colon cancer in father    Vertigo        Allergy:   No Known Allergies     Discontinued Medications:  There are no discontinued medications.    Current Medications:   Current Outpatient Medications   Medication Sig Dispense Refill    amoxicillin-clavulanate (AUGMENTIN) 875-125 MG per tablet Take 1 tablet by mouth 2 (Two) Times a Day. 20 tablet 0    atorvastatin (LIPITOR) 10 MG tablet Take 1 tablet by mouth Every Evening. 90 tablet 1    Cholecalciferol (Vitamin D3) 50 MCG (2000 UT) capsule Take 1 capsule by mouth Daily.      fexofenadine (Allegra Allergy) 180 MG tablet Take 1 tablet by mouth Daily. 90 tablet 3    meclizine (ANTIVERT) 25 MG tablet Take 1 tablet by mouth 3 (Three) Times a Day As Needed for Dizziness. 90 tablet 2    pantoprazole (PROTONIX) 40 MG EC tablet Take 1 tablet by mouth Daily. 30 tablet 5    predniSONE (DELTASONE) 20 MG tablet Take 1 tablet by mouth Daily. 5 tablet 0    brompheniramine-pseudoephedrine-DM 30-2-10 MG/5ML syrup Take 5 mL by mouth 4 (Four) Times a Day As Needed for Cough or Congestion. (Patient not taking: Reported on 12/11/2023) 118 mL 0     No current facility-administered medications for this visit.       Past Medical History:  Past Medical History:   Diagnosis Date    Anxiety     GERD (gastroesophageal reflux disease)     H. pylori infection 2021    Hyperlipidemia        Past Psychiatric History:    Never seen by psychiatrist, no admissions, SA, meds    Substance Abuse History:   Types:Denies all, including illicit  Withdrawal Symptoms:Denies  Longest Period Sober:Not Applicable   AA: Not applicable     Social History:  Martial Status:  Employed:Yes  Kids:Yes  House:Lives in a house   History: Denies    Social History     Socioeconomic History    Marital status:    Tobacco Use    Smoking status: Never     Passive exposure: Never    Smokeless tobacco: Never   Vaping Use    Vaping Use: Never used   Substance and Sexual Activity    Alcohol use: Never    Drug use: Never    Sexual activity: Defer  "      Family History:   Suicide Attempts: Denies  Suicide Completions:Denies      Family History   Problem Relation Age of Onset    No Known Problems Mother     Colon cancer Father     Cancer Father     No Known Problems Sister     No Known Problems Brother     No Known Problems Maternal Aunt     No Known Problems Paternal Aunt     No Known Problems Maternal Uncle     No Known Problems Paternal Uncle     No Known Problems Maternal Grandfather     No Known Problems Maternal Grandmother     No Known Problems Paternal Grandfather     No Known Problems Paternal Grandmother     No Known Problems Cousin     No Known Problems Other     ADD / ADHD Neg Hx     Alcohol abuse Neg Hx     Anxiety disorder Neg Hx     Bipolar disorder Neg Hx     Dementia Neg Hx     Depression Neg Hx     Drug abuse Neg Hx     OCD Neg Hx     Paranoid behavior Neg Hx     Schizophrenia Neg Hx     Seizures Neg Hx     Self-Injurious Behavior  Neg Hx     Suicide Attempts Neg Hx        Developmental History:     Childhood: Denies Abuse, \"great childhood\" I started reading and writing at 4 yo  High School:Completed  College: yes    Mental Status Exam  Appearance  : groomed, good eye contact, normal street clothes  Behavior  : pleasant and cooperative  Motor  : No abnormal  Speech  : accent, talkative, normal rhythm, rate, volume, tone, not hyperverbal, not pressured, normal prosidy  Mood  : \"I do worry\"  Affect  : mild anxiety, mood congruent, good variability  Thought Content  : negative suicidal ideations, negative homicidal ideations, negative obsessions  Perceptions  : negative auditory hallucinations, negative visual hallucinations  Thought Process  : linear  Insight/Judgement  : Fair/fair  Cognition  : grossly intact  Attention   : intact      Review of Systems:  Review of Systems   Constitutional:  Negative for diaphoresis and fatigue.   HENT:  Negative for drooling.    Eyes:  Positive for visual disturbance.   Respiratory:  Positive for cough. " "Negative for shortness of breath.    Cardiovascular:  Positive for chest pain. Negative for palpitations and leg swelling.   Gastrointestinal:  Negative for nausea and vomiting.   Endocrine: Positive for heat intolerance. Negative for cold intolerance.   Genitourinary:  Negative for difficulty urinating.   Musculoskeletal:  Positive for joint swelling.   Allergic/Immunologic: Negative for immunocompromised state.   Neurological:  Negative for dizziness, seizures, speech difficulty and numbness.       Physical Exam:  Physical Exam    Vital Signs:   /82   Pulse 96   Ht 167.6 cm (66\")   Wt 79.3 kg (174 lb 12.8 oz)   BMI 28.21 kg/m²      Lab Results:   Lab on 12/08/2023   Component Date Value Ref Range Status    WBC 12/08/2023 6.41  3.40 - 10.80 10*3/mm3 Final    RBC 12/08/2023 5.63  4.14 - 5.80 10*6/mm3 Final    Hemoglobin 12/08/2023 15.5  13.0 - 17.7 g/dL Final    Hematocrit 12/08/2023 46.6  37.5 - 51.0 % Final    MCV 12/08/2023 82.8  79.0 - 97.0 fL Final    MCH 12/08/2023 27.5  26.6 - 33.0 pg Final    MCHC 12/08/2023 33.3  31.5 - 35.7 g/dL Final    RDW 12/08/2023 13.1  12.3 - 15.4 % Final    RDW-SD 12/08/2023 39.1  37.0 - 54.0 fl Final    MPV 12/08/2023 10.7  6.0 - 12.0 fL Final    Platelets 12/08/2023 232  140 - 450 10*3/mm3 Final    Neutrophil % 12/08/2023 50.8  42.7 - 76.0 % Final    Lymphocyte % 12/08/2023 37.6  19.6 - 45.3 % Final    Monocyte % 12/08/2023 7.5  5.0 - 12.0 % Final    Eosinophil % 12/08/2023 3.3  0.3 - 6.2 % Final    Basophil % 12/08/2023 0.5  0.0 - 1.5 % Final    Immature Grans % 12/08/2023 0.3  0.0 - 0.5 % Final    Neutrophils, Absolute 12/08/2023 3.26  1.70 - 7.00 10*3/mm3 Final    Lymphocytes, Absolute 12/08/2023 2.41  0.70 - 3.10 10*3/mm3 Final    Monocytes, Absolute 12/08/2023 0.48  0.10 - 0.90 10*3/mm3 Final    Eosinophils, Absolute 12/08/2023 0.21  0.00 - 0.40 10*3/mm3 Final    Basophils, Absolute 12/08/2023 0.03  0.00 - 0.20 10*3/mm3 Final    Immature Grans, Absolute 12/08/2023 " 0.02  0.00 - 0.05 10*3/mm3 Final    nRBC 12/08/2023 0.0  0.0 - 0.2 /100 WBC Final    Glucose 12/08/2023 108 (H)  65 - 99 mg/dL Final    BUN 12/08/2023 10  6 - 20 mg/dL Final    Creatinine 12/08/2023 0.99  0.76 - 1.27 mg/dL Final    Sodium 12/08/2023 139  136 - 145 mmol/L Final    Potassium 12/08/2023 4.3  3.5 - 5.2 mmol/L Final    Chloride 12/08/2023 104  98 - 107 mmol/L Final    CO2 12/08/2023 27.0  22.0 - 29.0 mmol/L Final    Calcium 12/08/2023 9.4  8.6 - 10.5 mg/dL Final    Total Protein 12/08/2023 7.3  6.0 - 8.5 g/dL Final    Albumin 12/08/2023 4.6  3.5 - 5.2 g/dL Final    ALT (SGPT) 12/08/2023 30  1 - 41 U/L Final    AST (SGOT) 12/08/2023 22  1 - 40 U/L Final    Alkaline Phosphatase 12/08/2023 94  39 - 117 U/L Final    Total Bilirubin 12/08/2023 0.5  0.0 - 1.2 mg/dL Final    Globulin 12/08/2023 2.7  gm/dL Final    A/G Ratio 12/08/2023 1.7  g/dL Final    BUN/Creatinine Ratio 12/08/2023 10.1  7.0 - 25.0 Final    Anion Gap 12/08/2023 8.0  5.0 - 15.0 mmol/L Final    eGFR 12/08/2023 97.5  >60.0 mL/min/1.73 Final    Total Cholesterol 12/08/2023 162  0 - 200 mg/dL Final    Triglycerides 12/08/2023 196 (H)  0 - 150 mg/dL Final    HDL Cholesterol 12/08/2023 30 (L)  40 - 60 mg/dL Final    LDL Cholesterol  12/08/2023 98  0 - 100 mg/dL Final    Unable to calculate    VLDL Cholesterol 12/08/2023 34  5 - 40 mg/dL Final    LDL/HDL Ratio 12/08/2023 3.09   Final    Unable to calculate    TSH 12/08/2023 4.500 (H)  0.270 - 4.200 uIU/mL Final    Hepatitis C Ab 12/08/2023 Non-Reactive  Non-Reactive Final    Color, UA 12/08/2023 Yellow  Yellow, Straw Final    Appearance, UA 12/08/2023 Clear  Clear Final    pH, UA 12/08/2023 6.0  5.0 - 8.0 Final    Specific Gravity, UA 12/08/2023 1.023  1.005 - 1.030 Final    Glucose, UA 12/08/2023 Negative  Negative Final    Ketones, UA 12/08/2023 Negative  Negative Final    Bilirubin, UA 12/08/2023 Negative  Negative Final    Blood, UA 12/08/2023 Negative  Negative Final    Protein, UA 12/08/2023  Negative  Negative Final    Leuk Esterase, UA 12/08/2023 Negative  Negative Final    Nitrite, UA 12/08/2023 Negative  Negative Final    Urobilinogen, UA 12/08/2023 0.2 E.U./dL  0.2 - 1.0 E.U./dL Final    Extra Tube 12/08/2023 Hold for add-ons.   Final    Auto resulted.    Free T4 12/08/2023 1.12  0.93 - 1.70 ng/dL Final   Admission on 09/11/2023, Discharged on 09/11/2023   Component Date Value Ref Range Status    Glucose 09/11/2023 101 (H)  70 - 99 mg/dL Final    Serial Number: [id number]:  [id number]    Case Report 09/11/2023    Final                    Value:Surgical Pathology Report                         Case: [id number]                                  Authorizing Provider:  Luis Brown MD    Collected:           09/11/2023 08:34 AM          Ordering Location:     [facility] Received:            09/11/2023 11:22 AM                                 SUITES                                                                       Pathologist:           Dipti Jacob DO                                                       Specimens:   1) - Small Intestine, duodenum biopsies                                                             2) - Gastric, Antrum, gastric biopsies                                                              3) - Esophagus, Distal, distal esophagus biopsies                                          Clinical Information 09/11/2023    Final                    Value:This result contains rich text formatting which cannot be displayed here.    Final Diagnosis 09/11/2023    Final                    Value:This result contains rich text formatting which cannot be displayed here.    Gross Description 09/11/2023    Final                    Value:This result contains rich text formatting which cannot be displayed here.    Special Stains 09/11/2023    Final                    Value:This result contains rich text formatting which cannot be displayed here.    Microscopic  Description 09/11/2023    Final                    Value:This result contains rich text formatting which cannot be displayed here.       EKG Results:  No orders to display       Imaging Results:  No Images in the past 120 days found..      Assessment & Plan   Diagnoses and all orders for this visit:    1. Generalized anxiety disorder (Primary)  -     Ambulatory Referral to Behavioral Health        Visit Diagnoses:    ICD-10-CM ICD-9-CM   1. Generalized anxiety disorder  F41.1 300.02     12/11: Declines meds at this time, preferring psychotherapy first. Fear of getting cancer like his father. Not happy with [place] ([place] town). 3m    PLAN:  Safety: No acute safety concerns  Therapy: Referral Made [facility]  Risk Assessment: Risk of self-harm acutely is low to moderate.  Risk factors include anxiety disorder, and recent psychosocial stressors (pandemic). Protective factors include no family history, denies access to guns/weapons, no present SI, no history of suicide attempts or self-harm in the past, minimal AODA, healthcare seeking, future orientation, willingness to engage in care.  Risk of self-harm chronically is also low to moderate, but could be further elevated in the event of treatment noncompliance and/or AODA.  Meds: declines  Labs: none    Patient screened positive for depression based on a PHQ-9 score of 3 on 12/11/2023. Follow-up recommendations include: Referral to Mental Health specialist and Suicide Risk Assessment performed.           TREATMENT PLAN/GOALS: Continue supportive psychotherapy efforts and medications as indicated. Treatment and medication options discussed during today's visit. Patient acknowledged and verbally consented to continue with current treatment plan and was educated on the importance of compliance with treatment and follow-up appointments.    MEDICATION ISSUES:  JYO reviewed as expected.  Discussed medication options and treatment plan of prescribed medication as  well as the risks, benefits, and side effects including potential falls, possible impaired driving and metabolic adversities among others. Patient is agreeable to call the office with any worsening of symptoms or onset of side effects. Patient is agreeable to call 911 or go to the nearest ER should he/she begin having SI/HI. No medication side effects or related complaints today.     MEDS ORDERED DURING VISIT:  No orders of the defined types were placed in this encounter.      Return in about 3 months (around 3/11/2024).         This document has been electronically signed by Raven Mclean MD  December 11, 2023 09:24 EST    Dictated Utilizing Dragon Dictation: Part of this note may be an electronic transcription/translation of spoken language to printed text using the Dragon Dictation System.

## 2023-12-11 NOTE — TREATMENT PLAN
Multi-Disciplinary Problems (from Behavioral Health Treatment Plan)      Active Problems       Problem: Anxiety  Start Date: 12/11/23      Problem Details: The patient self-scales this problem as a 5 with 10 being the worst.          Goal Priority Start Date Expected End Date End Date    Patient will develop and implement behavioral and cognitive strategies to reduce anxiety and irrational fears. -- 12/11/23 -- --    Goal Details: Progress toward goal:  Not appropriate to rate progress toward goal since this is the initial treatment plan.          Goal Intervention Frequency Start Date End Date    Help patient explore past emotional issues in relation to present anxiety. Q Month 12/11/23 --    Intervention Details: Duration of treatment until until remission of symptoms.          Goal Intervention Frequency Start Date End Date    Help patient develop an awareness of their cognitive and physical responses to anxiety. Q Month 12/11/23 --    Intervention Details: Duration of treatment until until remission of symptoms.                  Problem: Depression  Start Date: 12/11/23      Problem Details: The patient self-scales this problem as a 2 with 10 being the worst.          Goal Priority Start Date Expected End Date End Date    Patient will demonstrate the ability to initiate new constructive life skills outside of sessions on a consistent basis. -- 12/11/23 -- --    Goal Details: Progress toward goal:  Not appropriate to rate progress toward goal since this is the initial treatment plan.          Goal Intervention Frequency Start Date End Date    Assist patient in setting attainable activities of daily living goals. PRN 12/11/23 --      Goal Intervention Frequency Start Date End Date    Provide education about depression Q Month 12/11/23 --    Intervention Details: Duration of treatment until until remission of symptoms.          Goal Intervention Frequency Start Date End Date    Assist patient in developing healthy  coping strategies. Q Month 12/11/23 --    Intervention Details: Duration of treatment until until remission of symptoms.                          Reviewed By       Raven Mclean MD 12/11/23 7221                     I have discussed and reviewed this treatment plan with the patient.

## 2023-12-11 NOTE — PATIENT INSTRUCTIONS
1.  Please return to clinic at your next scheduled visit.  Contact the clinic ([phone]) at least 24 hours prior in the event you need to cancel.  2.  Do no harm to yourself or others.    3.  Avoid alcohol and drugs.    4.  Take all medications as prescribed.  Please contact the clinic with any concerns. If you are in need of medication refills, please call the clinic at [phone].    5. Should you want to get in touch with your provider, Dr. Raven Mclean, please utilize [facility] or contact the office ([phone]), and staff will be able to page Dr. Mclean directly.  6.  In the event you have personal crisis, contact the following crisis numbers: Suicide Prevention Hotline 1-131.572.8222; DYLAN Helpline 0-815-371-DYLAN; [facility] Emergency Room [phone]; text HELLO to 603096; or 639.

## 2024-03-01 ENCOUNTER — OFFICE VISIT (OUTPATIENT)
Dept: FAMILY MEDICINE CLINIC | Facility: CLINIC | Age: 44
End: 2024-03-01
Payer: COMMERCIAL

## 2024-03-01 VITALS
SYSTOLIC BLOOD PRESSURE: 124 MMHG | RESPIRATION RATE: 16 BRPM | OXYGEN SATURATION: 100 % | BODY MASS INDEX: 27.64 KG/M2 | DIASTOLIC BLOOD PRESSURE: 70 MMHG | WEIGHT: 172 LBS | HEIGHT: 66 IN | TEMPERATURE: 98.2 F | HEART RATE: 110 BPM

## 2024-03-01 DIAGNOSIS — B37.0 THRUSH: ICD-10-CM

## 2024-03-01 DIAGNOSIS — J06.9 URI WITH COUGH AND CONGESTION: Primary | ICD-10-CM

## 2024-03-01 DIAGNOSIS — J02.9 SORE THROAT: ICD-10-CM

## 2024-03-01 LAB
EXPIRATION DATE: NORMAL
EXPIRATION DATE: NORMAL
FLUAV AG UPPER RESP QL IA.RAPID: NOT DETECTED
FLUBV AG UPPER RESP QL IA.RAPID: NOT DETECTED
INTERNAL CONTROL: NORMAL
INTERNAL CONTROL: NORMAL
Lab: 8725
Lab: NORMAL
S PYO AG THROAT QL: NEGATIVE
SARS-COV-2 AG UPPER RESP QL IA.RAPID: NOT DETECTED

## 2024-03-01 PROCEDURE — 87880 STREP A ASSAY W/OPTIC: CPT | Performed by: STUDENT IN AN ORGANIZED HEALTH CARE EDUCATION/TRAINING PROGRAM

## 2024-03-01 PROCEDURE — 87428 SARSCOV & INF VIR A&B AG IA: CPT | Performed by: STUDENT IN AN ORGANIZED HEALTH CARE EDUCATION/TRAINING PROGRAM

## 2024-03-01 PROCEDURE — 99213 OFFICE O/P EST LOW 20 MIN: CPT | Performed by: STUDENT IN AN ORGANIZED HEALTH CARE EDUCATION/TRAINING PROGRAM

## 2024-03-01 RX ORDER — BROMPHENIRAMINE MALEATE, PSEUDOEPHEDRINE HYDROCHLORIDE, AND DEXTROMETHORPHAN HYDROBROMIDE 2; 30; 10 MG/5ML; MG/5ML; MG/5ML
7 SYRUP ORAL 4 TIMES DAILY PRN
Qty: 118 ML | Refills: 0 | Status: SHIPPED | OUTPATIENT
Start: 2024-03-01

## 2024-03-01 RX ORDER — AZITHROMYCIN 250 MG/1
TABLET, FILM COATED ORAL
Qty: 6 TABLET | Refills: 0 | Status: SHIPPED | OUTPATIENT
Start: 2024-03-01

## 2024-03-01 RX ORDER — PREDNISONE 20 MG/1
20 TABLET ORAL DAILY
Qty: 5 TABLET | Refills: 0 | Status: SHIPPED | OUTPATIENT
Start: 2024-03-01

## 2024-03-01 NOTE — PROGRESS NOTES
"Chief Complaint  Cough    Subjective         Missael Maloney is a 43 y.o. male who presents to [facility]    43 years old comes to the clinic today for an acute visit.    Complaining of 1 week active symptoms of cough/congestion, sinus drainage and ear pain.  Denies any fever in last 24 hours but started with some fever about a week ago.  Denies any abdominal symptoms/anosmia or any other complaint.    No recent travel but does report sick contact at home.  Wife is having the same symptoms and kids were having this symptoms about a week ago.    Wife is present in the room.  Denies any chest pain or shortness of breath    Objective   Vital Signs:   Vitals:    03/01/24 1531   BP: 124/70   Pulse: 110   Resp: 16   Temp: 98.2 °F (36.8 °C)   SpO2: 100%   Weight: 78 kg (172 lb)   Height: 167.6 cm (66\")      Body mass index is 27.76 kg/m².   Wt Readings from Last 3 Encounters:   03/01/24 78 kg (172 lb)   12/11/23 79.3 kg (174 lb 12.8 oz)   12/07/23 78.8 kg (173 lb 12.8 oz)      BP Readings from Last 3 Encounters:   03/01/24 124/70   12/11/23 133/82   12/07/23 137/83        Patient Care Team:  Danielle Esteban APRN as PCP - General (Nurse Practitioner)     Physical Exam  HENT:      Right Ear: Tympanic membrane is erythematous.      Left Ear: Tympanic membrane is erythematous.      Mouth/Throat:      Pharynx: Pharyngeal swelling and posterior oropharyngeal erythema present.      Comments: Oral thrush  Pulmonary:      Effort: Pulmonary effort is normal.      Breath sounds: Normal breath sounds.                            Assessment and Plan   Diagnoses and all orders for this visit:    1. URI with cough and congestion (Primary)  -     POCT SARS-CoV-2 Antigen AG + Flu  -     nystatin (MYCOSTATIN) 100,000 unit/mL suspension; Swish and swallow 5 mL 4 (Four) Times a Day.  Dispense: 60 mL; Refill: 0  -     azithromycin (Zithromax Z-Pete) 250 MG tablet; Take 2 tablets by mouth on day 1, " then 1 tablet daily on days 2-5  Dispense: 6 tablet; Refill: 0  -     predniSONE (DELTASONE) 20 MG tablet; Take 1 tablet by mouth Daily.  Dispense: 5 tablet; Refill: 0  -     brompheniramine-pseudoephedrine-DM 30-2-10 MG/5ML syrup; Take 7 mL by mouth 4 (Four) Times a Day As Needed for Cough or Congestion.  Dispense: 118 mL; Refill: 0    2. Sore throat  -     POCT rapid strep A  -     nystatin (MYCOSTATIN) 100,000 unit/mL suspension; Swish and swallow 5 mL 4 (Four) Times a Day.  Dispense: 60 mL; Refill: 0  -     azithromycin (Zithromax Z-Pete) 250 MG tablet; Take 2 tablets by mouth on day 1, then 1 tablet daily on days 2-5  Dispense: 6 tablet; Refill: 0  -     predniSONE (DELTASONE) 20 MG tablet; Take 1 tablet by mouth Daily.  Dispense: 5 tablet; Refill: 0  -     brompheniramine-pseudoephedrine-DM 30-2-10 MG/5ML syrup; Take 7 mL by mouth 4 (Four) Times a Day As Needed for Cough or Congestion.  Dispense: 118 mL; Refill: 0    3. Thrush  -     nystatin (MYCOSTATIN) 100,000 unit/mL suspension; Swish and swallow 5 mL 4 (Four) Times a Day.  Dispense: 60 mL; Refill: 0  -     azithromycin (Zithromax Z-Pete) 250 MG tablet; Take 2 tablets by mouth on day 1, then 1 tablet daily on days 2-5  Dispense: 6 tablet; Refill: 0  -     predniSONE (DELTASONE) 20 MG tablet; Take 1 tablet by mouth Daily.  Dispense: 5 tablet; Refill: 0  -     brompheniramine-pseudoephedrine-DM 30-2-10 MG/5ML syrup; Take 7 mL by mouth 4 (Four) Times a Day As Needed for Cough or Congestion.  Dispense: 118 mL; Refill: 0      I will go ahead and treat patient empirically.  Negative rapid swab    Possibility of viral also discussed with patient.    Patient to call if not improved within the next 2 to 3 days as it may require further evaluation.    I have briefly examined patient's wife, just oral cavity/pharyngeal mucosal lining.  I will go ahead and prescribe her a Z-Pete and prednisone as well as she is a patient of this clinic.  I have strongly instructed to  follow-up with PCP as soon as possible.    Tobacco Use: Low Risk  (3/1/2024)    Patient History     Smoking Tobacco Use: Never     Smokeless Tobacco Use: Never     Passive Exposure: Never            Follow Up   No follow-ups on file.  Patient was given instructions and counseling regarding his condition or for health maintenance advice. Please see specific information pulled into the AVS if appropriate.

## 2024-03-25 RX ORDER — ATORVASTATIN CALCIUM 10 MG/1
10 TABLET, FILM COATED ORAL EVERY EVENING
Qty: 90 TABLET | Refills: 1 | Status: SHIPPED | OUTPATIENT
Start: 2024-03-25

## 2024-03-26 RX ORDER — PANTOPRAZOLE SODIUM 40 MG/1
40 TABLET, DELAYED RELEASE ORAL DAILY
Qty: 90 TABLET | Refills: 1 | Status: SHIPPED | OUTPATIENT
Start: 2024-03-26

## 2024-04-17 ENCOUNTER — LAB (OUTPATIENT)
Dept: LAB | Facility: HOSPITAL | Age: 44
End: 2024-04-17
Payer: COMMERCIAL

## 2024-04-17 DIAGNOSIS — R79.89 ELEVATED TSH: ICD-10-CM

## 2024-04-17 LAB — TSH SERPL DL<=0.05 MIU/L-ACNC: 2.47 UIU/ML (ref 0.27–4.2)

## 2024-04-17 PROCEDURE — 84443 ASSAY THYROID STIM HORMONE: CPT

## 2024-04-17 PROCEDURE — 36415 COLL VENOUS BLD VENIPUNCTURE: CPT

## 2024-04-25 DIAGNOSIS — M79.10 MUSCLE ACHE: ICD-10-CM

## 2024-04-25 DIAGNOSIS — Z01.89 ROUTINE LAB DRAW: ICD-10-CM

## 2024-04-25 DIAGNOSIS — E55.9 VITAMIN D DEFICIENCY: ICD-10-CM

## 2024-04-25 DIAGNOSIS — R53.83 FATIGUE, UNSPECIFIED TYPE: Primary | ICD-10-CM

## 2024-04-26 ENCOUNTER — LAB (OUTPATIENT)
Dept: LAB | Facility: HOSPITAL | Age: 44
End: 2024-04-26
Payer: COMMERCIAL

## 2024-04-26 DIAGNOSIS — Z01.89 ROUTINE LAB DRAW: ICD-10-CM

## 2024-04-26 DIAGNOSIS — M79.10 MUSCLE ACHE: ICD-10-CM

## 2024-04-26 DIAGNOSIS — R53.83 FATIGUE, UNSPECIFIED TYPE: ICD-10-CM

## 2024-04-26 DIAGNOSIS — E55.9 VITAMIN D DEFICIENCY: ICD-10-CM

## 2024-04-26 LAB
25(OH)D3 SERPL-MCNC: 26.7 NG/ML (ref 30–100)
ALBUMIN SERPL-MCNC: 4.6 G/DL (ref 3.5–5.2)
ALBUMIN/GLOB SERPL: 1.8 G/DL
ALP SERPL-CCNC: 81 U/L (ref 39–117)
ALT SERPL W P-5'-P-CCNC: 22 U/L (ref 1–41)
ANION GAP SERPL CALCULATED.3IONS-SCNC: 8 MMOL/L (ref 5–15)
AST SERPL-CCNC: 14 U/L (ref 1–40)
BASOPHILS # BLD AUTO: 0.02 10*3/MM3 (ref 0–0.2)
BASOPHILS NFR BLD AUTO: 0.4 % (ref 0–1.5)
BILIRUB SERPL-MCNC: 0.4 MG/DL (ref 0–1.2)
BUN SERPL-MCNC: 9 MG/DL (ref 6–20)
BUN/CREAT SERPL: 9.3 (ref 7–25)
CALCIUM SPEC-SCNC: 9.1 MG/DL (ref 8.6–10.5)
CHLORIDE SERPL-SCNC: 105 MMOL/L (ref 98–107)
CO2 SERPL-SCNC: 27 MMOL/L (ref 22–29)
CREAT SERPL-MCNC: 0.97 MG/DL (ref 0.76–1.27)
DEPRECATED RDW RBC AUTO: 38.1 FL (ref 37–54)
EGFRCR SERPLBLD CKD-EPI 2021: 100 ML/MIN/1.73
EOSINOPHIL # BLD AUTO: 0.14 10*3/MM3 (ref 0–0.4)
EOSINOPHIL NFR BLD AUTO: 2.6 % (ref 0.3–6.2)
ERYTHROCYTE [DISTWIDTH] IN BLOOD BY AUTOMATED COUNT: 13 % (ref 12.3–15.4)
FOLATE SERPL-MCNC: 15.1 NG/ML (ref 4.78–24.2)
GLOBULIN UR ELPH-MCNC: 2.6 GM/DL
GLUCOSE SERPL-MCNC: 95 MG/DL (ref 65–99)
HCT VFR BLD AUTO: 45.1 % (ref 37.5–51)
HGB BLD-MCNC: 14.7 G/DL (ref 13–17.7)
IMM GRANULOCYTES # BLD AUTO: 0.01 10*3/MM3 (ref 0–0.05)
IMM GRANULOCYTES NFR BLD AUTO: 0.2 % (ref 0–0.5)
LYMPHOCYTES # BLD AUTO: 2.17 10*3/MM3 (ref 0.7–3.1)
LYMPHOCYTES NFR BLD AUTO: 39.8 % (ref 19.6–45.3)
MAGNESIUM SERPL-MCNC: 2.3 MG/DL (ref 1.6–2.6)
MCH RBC QN AUTO: 26.7 PG (ref 26.6–33)
MCHC RBC AUTO-ENTMCNC: 32.6 G/DL (ref 31.5–35.7)
MCV RBC AUTO: 81.9 FL (ref 79–97)
MONOCYTES # BLD AUTO: 0.38 10*3/MM3 (ref 0.1–0.9)
MONOCYTES NFR BLD AUTO: 7 % (ref 5–12)
NEUTROPHILS NFR BLD AUTO: 2.73 10*3/MM3 (ref 1.7–7)
NEUTROPHILS NFR BLD AUTO: 50 % (ref 42.7–76)
NRBC BLD AUTO-RTO: 0 /100 WBC (ref 0–0.2)
PLATELET # BLD AUTO: 222 10*3/MM3 (ref 140–450)
PMV BLD AUTO: 10.6 FL (ref 6–12)
POTASSIUM SERPL-SCNC: 4 MMOL/L (ref 3.5–5.2)
PROT SERPL-MCNC: 7.2 G/DL (ref 6–8.5)
RBC # BLD AUTO: 5.51 10*6/MM3 (ref 4.14–5.8)
SODIUM SERPL-SCNC: 140 MMOL/L (ref 136–145)
VIT B12 BLD-MCNC: 358 PG/ML (ref 211–946)
WBC NRBC COR # BLD AUTO: 5.45 10*3/MM3 (ref 3.4–10.8)

## 2024-04-26 PROCEDURE — 83735 ASSAY OF MAGNESIUM: CPT

## 2024-04-26 PROCEDURE — 85025 COMPLETE CBC W/AUTO DIFF WBC: CPT

## 2024-04-26 PROCEDURE — 82607 VITAMIN B-12: CPT

## 2024-04-26 PROCEDURE — 82746 ASSAY OF FOLIC ACID SERUM: CPT

## 2024-04-26 PROCEDURE — 80053 COMPREHEN METABOLIC PANEL: CPT

## 2024-04-26 PROCEDURE — 82306 VITAMIN D 25 HYDROXY: CPT

## 2024-04-26 PROCEDURE — 36415 COLL VENOUS BLD VENIPUNCTURE: CPT

## 2024-07-10 ENCOUNTER — TELEPHONE (OUTPATIENT)
Dept: PSYCHIATRY | Facility: CLINIC | Age: 44
End: 2024-07-10
Payer: COMMERCIAL

## 2024-07-10 NOTE — TELEPHONE ENCOUNTER
Patient was referred out for psychotherapy, several attempts made to follow up on referral order, including mailing a contact letter with no success, closing out referral order.

## 2024-07-10 NOTE — TELEPHONE ENCOUNTER
Thank you    Patient was referred out for psychotherapy, several attempts made to follow up on referral order, including mailing a contact letter with no success, closing out referral order.

## 2024-07-23 ENCOUNTER — OFFICE VISIT (OUTPATIENT)
Dept: FAMILY MEDICINE CLINIC | Facility: CLINIC | Age: 44
End: 2024-07-23
Payer: COMMERCIAL

## 2024-07-23 VITALS
HEIGHT: 66 IN | TEMPERATURE: 97.9 F | SYSTOLIC BLOOD PRESSURE: 122 MMHG | DIASTOLIC BLOOD PRESSURE: 82 MMHG | WEIGHT: 168.7 LBS | HEART RATE: 90 BPM | OXYGEN SATURATION: 98 % | BODY MASS INDEX: 27.11 KG/M2

## 2024-07-23 DIAGNOSIS — Z01.89 ROUTINE LAB DRAW: ICD-10-CM

## 2024-07-23 DIAGNOSIS — M25.562 CHRONIC PAIN OF BOTH KNEES: Primary | ICD-10-CM

## 2024-07-23 DIAGNOSIS — E78.2 MIXED HYPERLIPIDEMIA: ICD-10-CM

## 2024-07-23 DIAGNOSIS — R53.83 FATIGUE, UNSPECIFIED TYPE: ICD-10-CM

## 2024-07-23 DIAGNOSIS — E55.9 VITAMIN D DEFICIENCY: ICD-10-CM

## 2024-07-23 DIAGNOSIS — R42 VERTIGO: ICD-10-CM

## 2024-07-23 DIAGNOSIS — M25.561 CHRONIC PAIN OF BOTH KNEES: Primary | ICD-10-CM

## 2024-07-23 DIAGNOSIS — G89.29 CHRONIC PAIN OF BOTH KNEES: Primary | ICD-10-CM

## 2024-07-23 PROCEDURE — 99214 OFFICE O/P EST MOD 30 MIN: CPT

## 2024-07-23 NOTE — PROGRESS NOTES
Missael Maloney presents to [facility] with complaints of fatigue, bilateral knee pain, worsening vertigo.       History of Present Illness  This is a 43 year old female who presents to the clinic with complaints of fatigue, bilateral knee pain, and worsening vertigo.    Patient states that he had a lot of contributing factors that seem to be really impacting his quality life.  Patient states that both of his knees over the past several months have been giving him a lot of fits.  States that he is not really sure what he did to injure it, but does have a lot of pain going up and down stairs, does not feel like they are very stable, and has just a lot of achiness in both of his knees.  Has not noted any significant swelling although he has been taking ibuprofen routinely along with other painkillers to try to help with the pain.  Is wondering what else could be causing this pain, he is trying to be more active but also was having other issues with vertigo that is inhibiting him from being as physically active as he would like.  States that the vertigo really is impacting him, whenever he starts exercising within 5 minutes he becomes extremely dizzy, has some blurred vision, gets very nauseous, and just does not feel good.  States that this is really bothering him, that he really wants to exercise and feel better, but it is impacting him.  Patient states that there is also other times that he will turn in certain directions, will become extremely dizzy.  Has not had this formally evaluated via ENT but would be willing to do so.    Also reviewed previous blood work, patient has not been taking vitamin D for few months and has noticed an improvement in his joint pain.  Is pleased with that is wanting to know if he needs to stay on the same dosage of vitamin D or increase it    Up-to-date on preventative screenings.  Refuses vaccine/immunizations.    The following portions of the  "patient's history were personally reviewed and updated as appropriate: allergies, current medications, past medical history, past surgical history, past family history, and past social history.       Objective   Vital Signs:   /82 (BP Location: Left arm, Patient Position: Sitting, Cuff Size: Adult)   Pulse 90   Temp 97.9 °F (36.6 °C)   Ht 167.6 cm (65.98\")   Wt 76.5 kg (168 lb 11.2 oz)   SpO2 98%   BMI 27.24 kg/m²     Body mass index is 27.24 kg/m².    All labs, imaging, test results, and specialty provider notes reviewed with patient.     Physical Exam  Vitals reviewed.   Constitutional:       Appearance: Normal appearance.   Cardiovascular:      Rate and Rhythm: Normal rate and regular rhythm.      Pulses: Normal pulses.      Heart sounds: Normal heart sounds.   Pulmonary:      Effort: Pulmonary effort is normal.      Breath sounds: Normal breath sounds.   Neurological:      General: No focal deficit present.      Mental Status: He is alert and oriented to person, place, and time.                Assessment and Plan:  Diagnoses and all orders for this visit:    1. Chronic pain of both knees (Primary)  -     XR Knee 3 View Right; Future  -     XR Knee 3 View Left; Future    2. Fatigue, unspecified type  -     Vitamin B12 & Folate; Future  -     Thyroid Antibodies; Future    3. Vertigo  -     Ambulatory Referral to ENT (Otolaryngology)    4. Vitamin D deficiency  -     Vitamin D,25-Hydroxy; Future    5. Mixed hyperlipidemia  -     Lipid Panel; Future    6. Routine lab draw  -     CBC Auto Differential; Future  -     Comprehensive Metabolic Panel; Future  -     TSH Rfx On Abnormal To Free T4; Future  -     Urinalysis With Culture If Indicated -; Future      Will start with evaluating both knees with x-rays, I do think it is a compounded issue of deconditioning as well as could be some early arthritis that could be causing symptoms.  Based off results of x-rays, further treatment to be considered at that " time.  Could consider course of physical therapy as well.  Will also do a full evaluation of patient's blood work as well to make sure nothing there could be contributing to patient's dizziness, vertigo symptoms, will also place referral to ENT for further evaluation.  May ultimately need physical therapy for vertigo as well.  Will recheck vitamin D level and adjust dosage if needed.    Follow Up:  No follow-ups on file.    Patient was given instructions and counseling regarding his condition or for health maintenance advice. Please see specific information pulled into the AVS if appropriate.

## 2024-07-29 ENCOUNTER — HOSPITAL ENCOUNTER (OUTPATIENT)
Dept: GENERAL RADIOLOGY | Facility: HOSPITAL | Age: 44
Discharge: HOME | End: 2024-07-29
Payer: COMMERCIAL

## 2024-07-29 ENCOUNTER — LAB (OUTPATIENT)
Dept: LAB | Facility: HOSPITAL | Age: 44
End: 2024-07-29
Payer: COMMERCIAL

## 2024-07-29 DIAGNOSIS — M25.561 CHRONIC PAIN OF BOTH KNEES: ICD-10-CM

## 2024-07-29 DIAGNOSIS — G89.29 CHRONIC PAIN OF BOTH KNEES: ICD-10-CM

## 2024-07-29 DIAGNOSIS — M25.562 CHRONIC PAIN OF BOTH KNEES: ICD-10-CM

## 2024-07-29 DIAGNOSIS — E78.2 MIXED HYPERLIPIDEMIA: ICD-10-CM

## 2024-07-29 DIAGNOSIS — E55.9 VITAMIN D DEFICIENCY: ICD-10-CM

## 2024-07-29 DIAGNOSIS — Z01.89 ROUTINE LAB DRAW: ICD-10-CM

## 2024-07-29 DIAGNOSIS — R53.83 FATIGUE, UNSPECIFIED TYPE: ICD-10-CM

## 2024-07-29 LAB
25(OH)D3 SERPL-MCNC: 34.3 NG/ML (ref 30–100)
ALBUMIN SERPL-MCNC: 4.6 G/DL (ref 3.5–5.2)
ALBUMIN/GLOB SERPL: 1.5 G/DL
ALP SERPL-CCNC: 75 U/L (ref 39–117)
ALT SERPL W P-5'-P-CCNC: 24 U/L (ref 1–41)
ANION GAP SERPL CALCULATED.3IONS-SCNC: 10.9 MMOL/L (ref 5–15)
AST SERPL-CCNC: 20 U/L (ref 1–40)
BASOPHILS # BLD AUTO: 0.02 10*3/MM3 (ref 0–0.2)
BASOPHILS NFR BLD AUTO: 0.4 % (ref 0–1.5)
BILIRUB SERPL-MCNC: 0.4 MG/DL (ref 0–1.2)
BILIRUB UR QL STRIP: NEGATIVE
BUN SERPL-MCNC: 10 MG/DL (ref 6–20)
BUN/CREAT SERPL: 9.7 (ref 7–25)
CALCIUM SPEC-SCNC: 9.1 MG/DL (ref 8.6–10.5)
CHLORIDE SERPL-SCNC: 103 MMOL/L (ref 98–107)
CHOLEST SERPL-MCNC: 248 MG/DL (ref 0–200)
CLARITY UR: CLEAR
CO2 SERPL-SCNC: 26.1 MMOL/L (ref 22–29)
COLOR UR: YELLOW
CREAT SERPL-MCNC: 1.03 MG/DL (ref 0.76–1.27)
DEPRECATED RDW RBC AUTO: 37.4 FL (ref 37–54)
EGFRCR SERPLBLD CKD-EPI 2021: 93 ML/MIN/1.73
EOSINOPHIL # BLD AUTO: 0.13 10*3/MM3 (ref 0–0.4)
EOSINOPHIL NFR BLD AUTO: 2.3 % (ref 0.3–6.2)
ERYTHROCYTE [DISTWIDTH] IN BLOOD BY AUTOMATED COUNT: 12.6 % (ref 12.3–15.4)
FOLATE SERPL-MCNC: 16.8 NG/ML (ref 4.78–24.2)
GLOBULIN UR ELPH-MCNC: 3.1 GM/DL
GLUCOSE SERPL-MCNC: 101 MG/DL (ref 65–99)
GLUCOSE UR STRIP-MCNC: NEGATIVE MG/DL
HCT VFR BLD AUTO: 47.3 % (ref 37.5–51)
HDLC SERPL-MCNC: 36 MG/DL (ref 40–60)
HGB BLD-MCNC: 15.7 G/DL (ref 13–17.7)
HGB UR QL STRIP.AUTO: NEGATIVE
HOLD SPECIMEN: NORMAL
IMM GRANULOCYTES # BLD AUTO: 0.03 10*3/MM3 (ref 0–0.05)
IMM GRANULOCYTES NFR BLD AUTO: 0.5 % (ref 0–0.5)
KETONES UR QL STRIP: ABNORMAL
LDLC SERPL CALC-MCNC: 173 MG/DL (ref 0–100)
LDLC/HDLC SERPL: 4.73 {RATIO}
LEUKOCYTE ESTERASE UR QL STRIP.AUTO: NEGATIVE
LYMPHOCYTES # BLD AUTO: 1.96 10*3/MM3 (ref 0.7–3.1)
LYMPHOCYTES NFR BLD AUTO: 34.3 % (ref 19.6–45.3)
MCH RBC QN AUTO: 27.4 PG (ref 26.6–33)
MCHC RBC AUTO-ENTMCNC: 33.2 G/DL (ref 31.5–35.7)
MCV RBC AUTO: 82.5 FL (ref 79–97)
MONOCYTES # BLD AUTO: 0.44 10*3/MM3 (ref 0.1–0.9)
MONOCYTES NFR BLD AUTO: 7.7 % (ref 5–12)
NEUTROPHILS NFR BLD AUTO: 3.13 10*3/MM3 (ref 1.7–7)
NEUTROPHILS NFR BLD AUTO: 54.8 % (ref 42.7–76)
NITRITE UR QL STRIP: NEGATIVE
NRBC BLD AUTO-RTO: 0 /100 WBC (ref 0–0.2)
PH UR STRIP.AUTO: 6 [PH] (ref 5–8)
PLATELET # BLD AUTO: 239 10*3/MM3 (ref 140–450)
PMV BLD AUTO: 10.7 FL (ref 6–12)
POTASSIUM SERPL-SCNC: 4.1 MMOL/L (ref 3.5–5.2)
PROT SERPL-MCNC: 7.7 G/DL (ref 6–8.5)
PROT UR QL STRIP: NEGATIVE
RBC # BLD AUTO: 5.73 10*6/MM3 (ref 4.14–5.8)
SODIUM SERPL-SCNC: 140 MMOL/L (ref 136–145)
SP GR UR STRIP: 1.02 (ref 1–1.03)
T4 FREE SERPL-MCNC: 1.16 NG/DL (ref 0.92–1.68)
TRIGL SERPL-MCNC: 208 MG/DL (ref 0–150)
TSH SERPL DL<=0.05 MIU/L-ACNC: 5.26 UIU/ML (ref 0.27–4.2)
UROBILINOGEN UR QL STRIP: ABNORMAL
VIT B12 BLD-MCNC: 397 PG/ML (ref 211–946)
VLDLC SERPL-MCNC: 39 MG/DL (ref 5–40)
WBC NRBC COR # BLD AUTO: 5.71 10*3/MM3 (ref 3.4–10.8)

## 2024-07-29 PROCEDURE — 36415 COLL VENOUS BLD VENIPUNCTURE: CPT

## 2024-07-29 PROCEDURE — 81003 URINALYSIS AUTO W/O SCOPE: CPT

## 2024-07-29 PROCEDURE — 82746 ASSAY OF FOLIC ACID SERUM: CPT

## 2024-07-29 PROCEDURE — 82306 VITAMIN D 25 HYDROXY: CPT

## 2024-07-29 PROCEDURE — 82607 VITAMIN B-12: CPT

## 2024-07-29 PROCEDURE — 80050 GENERAL HEALTH PANEL: CPT

## 2024-07-29 PROCEDURE — 86376 MICROSOMAL ANTIBODY EACH: CPT

## 2024-07-29 PROCEDURE — 80061 LIPID PANEL: CPT

## 2024-07-29 PROCEDURE — 86800 THYROGLOBULIN ANTIBODY: CPT

## 2024-07-29 PROCEDURE — 73562 X-RAY EXAM OF KNEE 3: CPT

## 2024-07-29 PROCEDURE — 84439 ASSAY OF FREE THYROXINE: CPT

## 2024-07-30 DIAGNOSIS — M25.561 CHRONIC PAIN OF BOTH KNEES: Primary | ICD-10-CM

## 2024-07-30 DIAGNOSIS — G89.29 CHRONIC PAIN OF BOTH KNEES: Primary | ICD-10-CM

## 2024-07-30 DIAGNOSIS — M25.562 CHRONIC PAIN OF BOTH KNEES: Primary | ICD-10-CM

## 2024-07-30 LAB
THYROGLOB AB SERPL-ACNC: <1 IU/ML (ref 0–0.9)
THYROPEROXIDASE AB SERPL-ACNC: <9 IU/ML (ref 0–34)

## 2024-07-31 RX ORDER — AZITHROMYCIN 250 MG/1
TABLET, FILM COATED ORAL
Qty: 6 TABLET | Refills: 0 | Status: SHIPPED | OUTPATIENT
Start: 2024-07-31

## 2024-08-05 DIAGNOSIS — R79.89 ELEVATED TSH: Primary | ICD-10-CM

## 2024-08-15 ENCOUNTER — OFFICE VISIT (OUTPATIENT)
Dept: ORTHOPEDIC SURGERY | Facility: CLINIC | Age: 44
End: 2024-08-15
Payer: COMMERCIAL

## 2024-08-15 VITALS
HEIGHT: 66 IN | DIASTOLIC BLOOD PRESSURE: 84 MMHG | OXYGEN SATURATION: 97 % | WEIGHT: 161 LBS | HEART RATE: 93 BPM | SYSTOLIC BLOOD PRESSURE: 132 MMHG | BODY MASS INDEX: 25.88 KG/M2

## 2024-08-15 DIAGNOSIS — M25.562 PAIN IN BOTH KNEES, UNSPECIFIED CHRONICITY: Primary | ICD-10-CM

## 2024-08-15 DIAGNOSIS — M94.20 CHONDROMALACIA: ICD-10-CM

## 2024-08-15 DIAGNOSIS — M25.561 PAIN IN BOTH KNEES, UNSPECIFIED CHRONICITY: Primary | ICD-10-CM

## 2024-08-15 RX ORDER — DICLOFENAC SODIUM 75 MG/1
75 TABLET, DELAYED RELEASE ORAL 2 TIMES DAILY
Qty: 60 TABLET | Refills: 2 | Status: SHIPPED | OUTPATIENT
Start: 2024-08-15

## 2024-08-15 NOTE — PROGRESS NOTES
"Chief Complaint  Pain and Initial Evaluation of the Left Knee and Pain and Initial Evaluation of the Right Knee       Subjective      Missael Maloney presents to [facility] for an evaluation of bilateral knee. His knee's have been bothering him for about a month. He noticed the pain after he was playing soccer with his kids. He reports pain and stiffness to his knee's. He recently went to his family doctor where he had x-rays done. His right knee is worse than his left knee. He reports no prior surgery on his knee's. He has used topical creams without much relief. He locates his pain to the supra patella knee. He has has numbness to his upper thigh region and states his right side is worse than his left.     No Known Allergies     Social History     Socioeconomic History    Marital status:    Tobacco Use    Smoking status: Never     Passive exposure: Never    Smokeless tobacco: Never   Vaping Use    Vaping status: Never Used   Substance and Sexual Activity    Alcohol use: Never    Drug use: Never    Sexual activity: Defer        I reviewed the patient's chief complaint, history of present illness, review of systems, past medical history, surgical history, family history, social history, medications, and allergy list.     Review of Systems     Constitutional: Denies fevers, chills, weight loss  Cardiovascular: Denies chest pain, shortness of breath  Skin: Denies rashes, acute skin changes  Neurologic: Denies headache, loss of consciousness  MSK: Bilateral knee pain       Vital Signs:   /84   Pulse 93   Ht 167.6 cm (66\")   Wt 73 kg (161 lb)   SpO2 97%   BMI 25.99 kg/m²            Ortho Exam    Physical Exam  General:Alert. No acute distress     Right lower extremity: full extension, flexion to 130 degrees, no effusion, no swelling,  stable to varus/valgus stress, stable to anterior/posterior drawer, negative Lachman's, negative  Reid's, non tender to the " medial joint line  and lateral joint line, neurovascularly intact, calf soft, positive EHL, FHL, GS, and TA. Sensation intact to all 5 nerves of the foot.     Left lower extremity: full extension, flexion to 130 degrees, stable to varus/valgus stress, stable to anterior/posterior drawer , negative  Lachman's, negative  Reid's,  non tender to the medial joint line  and lateral joint line, neurovascularly intact, calf soft, positive EHL, FHL, GS, and TA. Sensation intact to all 5 nerves of the foot.     Procedures      Imaging Results (Most Recent)       None             Result Review :       XR Knee 3 View Left    Result Date: 7/29/2024  Narrative: XR KNEE 3 VW RIGHT, XR KNEE 3 VW LEFT Date of Exam: 7/29/2024 8:17 AM EDT Indication: Worsening bilateral knee pain Comparison: None available. Findings: Right knee: There is mild spurring in the medial compartment consistent with arthritis. The lateral and patellofemoral compartments are normal. There is no acute fracture or dislocation. There are no osseous lesions. There is no knee joint effusion. Left knee: There is minor spurring in the medial compartment consistent with arthritis. The patellofemoral and lateral joint space compartments are normal. There is no acute fracture or dislocation. There are no osseous lesions. There is no knee joint effusion.     Impression: Impression: 1. Osteoarthritis in the medial compartments of both knees. 2. No acute findings. Electronically Signed: Bola Mcqueen MD  7/29/2024 1:37 PM EDT  Workstation ID: [id number]    XR Knee 3 View Right    Result Date: 7/29/2024  Narrative: XR KNEE 3 VW RIGHT, XR KNEE 3 VW LEFT Date of Exam: 7/29/2024 8:17 AM EDT Indication: Worsening bilateral knee pain Comparison: None available. Findings: Right knee: There is mild spurring in the medial compartment consistent with arthritis. The lateral and patellofemoral compartments are normal. There is no acute fracture or dislocation. There are no osseous  lesions. There is no knee joint effusion. Left knee: There is minor spurring in the medial compartment consistent with arthritis. The patellofemoral and lateral joint space compartments are normal. There is no acute fracture or dislocation. There are no osseous lesions. There is no knee joint effusion.     Impression: Impression: 1. Osteoarthritis in the medial compartments of both knees. 2. No acute findings. Electronically Signed: Bola Mcqueen MD  7/29/2024 1:37 PM EDT  Workstation ID: [id number]            Assessment and Plan     Diagnoses and all orders for this visit:    1. Pain in both knees, unspecified chronicity (Primary)    2. Chondromalacia        The patient presents here today for an evaluation of bilateral knee pain.     Home exercises given today.     Diclofenac sent into the pharmacy today.     May consider MRI in the future if symptoms persist.     Call or return if worsening symptoms.    Follow Up     6 - 8 weeks     Patient was given instructions and counseling regarding his condition or for health maintenance advice. Please see specific information pulled into the AVS if appropriate.     Scribed for Endy Schmitz MD by Viridiana Farfan.  08/15/24   07:42 EDT    I have personally performed the services described in this document as scribed by the above individual and it is both accurate and complete. Endy Schmitz MD 08/15/24    No

## 2024-08-22 RX ORDER — LEVOTHYROXINE SODIUM 0.03 MG/1
25 TABLET ORAL
Qty: 30 TABLET | Refills: 2 | Status: SHIPPED | OUTPATIENT
Start: 2024-08-22

## 2024-09-26 ENCOUNTER — OFFICE VISIT (OUTPATIENT)
Dept: ORTHOPEDIC SURGERY | Facility: CLINIC | Age: 44
End: 2024-09-26
Payer: COMMERCIAL

## 2024-09-26 VITALS
HEIGHT: 66 IN | SYSTOLIC BLOOD PRESSURE: 152 MMHG | BODY MASS INDEX: 25.71 KG/M2 | WEIGHT: 160 LBS | DIASTOLIC BLOOD PRESSURE: 92 MMHG | OXYGEN SATURATION: 98 % | HEART RATE: 72 BPM

## 2024-09-26 DIAGNOSIS — M25.561 PAIN IN BOTH KNEES, UNSPECIFIED CHRONICITY: Primary | ICD-10-CM

## 2024-09-26 DIAGNOSIS — M25.562 PAIN IN BOTH KNEES, UNSPECIFIED CHRONICITY: Primary | ICD-10-CM

## 2024-09-26 DIAGNOSIS — M94.20 CHONDROMALACIA: ICD-10-CM

## 2024-10-04 ENCOUNTER — LAB (OUTPATIENT)
Dept: LAB | Facility: HOSPITAL | Age: 44
End: 2024-10-04
Payer: COMMERCIAL

## 2024-10-04 DIAGNOSIS — R79.89 ELEVATED TSH: ICD-10-CM

## 2024-10-04 LAB — TSH SERPL DL<=0.05 MIU/L-ACNC: 1.33 UIU/ML (ref 0.27–4.2)

## 2024-10-04 PROCEDURE — 84443 ASSAY THYROID STIM HORMONE: CPT

## 2024-10-04 PROCEDURE — 36415 COLL VENOUS BLD VENIPUNCTURE: CPT

## 2024-10-07 RX ORDER — ATORVASTATIN CALCIUM 10 MG/1
10 TABLET, FILM COATED ORAL EVERY EVENING
Qty: 90 TABLET | Refills: 1 | Status: SHIPPED | OUTPATIENT
Start: 2024-10-07

## 2024-10-07 NOTE — TELEPHONE ENCOUNTER
Medication Requested Protonix 40 MG    Last Refill 03/26/2024    Last OV 09/22/2023    Next OV Not scheduled.     Medication pended for approval and correct pharmacy verified Yes    Please advise.

## 2024-10-08 RX ORDER — PANTOPRAZOLE SODIUM 40 MG/1
40 TABLET, DELAYED RELEASE ORAL DAILY
Qty: 90 TABLET | Refills: 1 | OUTPATIENT
Start: 2024-10-08

## 2024-10-08 NOTE — TELEPHONE ENCOUNTER
I left pt a VM that he will need to get his refills from PCP for his protonix. I left the office number as a call back number if he has any questions.

## 2024-10-18 DIAGNOSIS — K21.9 GASTROESOPHAGEAL REFLUX DISEASE, UNSPECIFIED WHETHER ESOPHAGITIS PRESENT: Primary | ICD-10-CM

## 2024-10-25 RX ORDER — PANTOPRAZOLE SODIUM 40 MG/1
40 TABLET, DELAYED RELEASE ORAL DAILY
Qty: 90 TABLET | Refills: 1 | OUTPATIENT
Start: 2024-10-25

## 2024-10-25 NOTE — TELEPHONE ENCOUNTER
Medication Requested Pantoprazole     Last Refill 03/26/2024    Last OV 09/22/2023    Next OV Not scheduled    Medication pended for approval and correct pharmacy verified Yes

## 2024-10-25 NOTE — TELEPHONE ENCOUNTER
Needs to get prescription from PCP or make follow up appointment to continue refills through our office

## 2024-10-28 RX ORDER — PANTOPRAZOLE SODIUM 40 MG/1
40 TABLET, DELAYED RELEASE ORAL DAILY
Qty: 90 TABLET | Refills: 3 | Status: SHIPPED | OUTPATIENT
Start: 2024-10-28

## 2024-10-29 ENCOUNTER — HOSPITAL ENCOUNTER (OUTPATIENT)
Dept: MRI IMAGING | Facility: HOSPITAL | Age: 44
Discharge: HOME | End: 2024-10-29
Payer: COMMERCIAL

## 2024-10-29 DIAGNOSIS — M25.561 PAIN IN BOTH KNEES, UNSPECIFIED CHRONICITY: ICD-10-CM

## 2024-10-29 DIAGNOSIS — M25.562 PAIN IN BOTH KNEES, UNSPECIFIED CHRONICITY: ICD-10-CM

## 2024-10-29 DIAGNOSIS — M94.20 CHONDROMALACIA: ICD-10-CM

## 2024-10-29 PROCEDURE — 73721 MRI JNT OF LWR EXTRE W/O DYE: CPT

## 2024-11-04 ENCOUNTER — TELEPHONE (OUTPATIENT)
Dept: ORTHOPEDIC SURGERY | Facility: CLINIC | Age: 44
End: 2024-11-04
Payer: COMMERCIAL

## 2024-11-04 NOTE — TELEPHONE ENCOUNTER
PATIENT IS SCHEDULED FOR 11/11/24 @ 1:00 PM WITH LOIS AGRAWAL TO GO OVER MRI RESULTS FOR BILATERAL KNEES. APPOINTMENT IS @ [address] [place], [place] [address] [id number]     HUB TO RELAY

## 2024-11-08 ENCOUNTER — OFFICE VISIT (OUTPATIENT)
Dept: GASTROENTEROLOGY | Facility: CLINIC | Age: 44
End: 2024-11-08
Payer: COMMERCIAL

## 2024-11-08 ENCOUNTER — LAB (OUTPATIENT)
Dept: LAB | Facility: HOSPITAL | Age: 44
End: 2024-11-08
Payer: COMMERCIAL

## 2024-11-08 VITALS
HEART RATE: 75 BPM | SYSTOLIC BLOOD PRESSURE: 131 MMHG | DIASTOLIC BLOOD PRESSURE: 76 MMHG | WEIGHT: 158 LBS | BODY MASS INDEX: 25.39 KG/M2 | OXYGEN SATURATION: 100 % | HEIGHT: 66 IN

## 2024-11-08 DIAGNOSIS — R10.13 EPIGASTRIC PAIN: ICD-10-CM

## 2024-11-08 DIAGNOSIS — K21.9 GASTROESOPHAGEAL REFLUX DISEASE, UNSPECIFIED WHETHER ESOPHAGITIS PRESENT: Primary | ICD-10-CM

## 2024-11-08 LAB
ALBUMIN SERPL-MCNC: 4.6 G/DL (ref 3.5–5.2)
ALBUMIN/GLOB SERPL: 1.6 G/DL
ALP SERPL-CCNC: 76 U/L (ref 39–117)
ALT SERPL W P-5'-P-CCNC: 14 U/L (ref 1–41)
AMYLASE SERPL-CCNC: 70 U/L (ref 28–100)
ANION GAP SERPL CALCULATED.3IONS-SCNC: 10.1 MMOL/L (ref 5–15)
AST SERPL-CCNC: 13 U/L (ref 1–40)
BILIRUB SERPL-MCNC: 0.3 MG/DL (ref 0–1.2)
BUN SERPL-MCNC: 10 MG/DL (ref 6–20)
BUN/CREAT SERPL: 10.6 (ref 7–25)
CALCIUM SPEC-SCNC: 9.7 MG/DL (ref 8.6–10.5)
CHLORIDE SERPL-SCNC: 106 MMOL/L (ref 98–107)
CO2 SERPL-SCNC: 26.9 MMOL/L (ref 22–29)
CREAT SERPL-MCNC: 0.94 MG/DL (ref 0.76–1.27)
EGFRCR SERPLBLD CKD-EPI 2021: 103.2 ML/MIN/1.73
GLOBULIN UR ELPH-MCNC: 2.9 GM/DL
GLUCOSE SERPL-MCNC: 97 MG/DL (ref 65–99)
LIPASE SERPL-CCNC: 55 U/L (ref 13–60)
POTASSIUM SERPL-SCNC: 4.5 MMOL/L (ref 3.5–5.2)
PROT SERPL-MCNC: 7.5 G/DL (ref 6–8.5)
SODIUM SERPL-SCNC: 143 MMOL/L (ref 136–145)

## 2024-11-08 PROCEDURE — 82150 ASSAY OF AMYLASE: CPT

## 2024-11-08 PROCEDURE — 80053 COMPREHEN METABOLIC PANEL: CPT

## 2024-11-08 PROCEDURE — 83690 ASSAY OF LIPASE: CPT

## 2024-11-08 PROCEDURE — 36415 COLL VENOUS BLD VENIPUNCTURE: CPT

## 2024-11-08 RX ORDER — VONOPRAZAN FUMARATE 26.72 MG/1
20 TABLET ORAL DAILY
Qty: 90 TABLET | Refills: 1 | Status: SHIPPED | OUTPATIENT
Start: 2024-11-08

## 2024-11-08 NOTE — PROGRESS NOTES
Chief Complaint  Follow-up, Abdominal Pain (Epigastric pain ), and Bloated    Missael Maloney is a 44 y.o. male who presents to [facility] for follow up.     History of present Illness  Establish care through  for dyspepsia   EGD 09/11/2023 by Dr. 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  Esophageal and duodenal biopsies normal.  Stomach biopsies-mild chronic and focal active gastritis.    Last office 9/22/23 - Reflux symptoms controlled Protonix 40mg daily, cannot skip a dose without return of symptoms. Family history of colon cancer in his father. Last colonoscopy at age 41 which was normal.     Patient presents to the office for epigastric pain. Symptoms previously controlled with Protonix. He was started on thyroid medication and had to switch timing of PPI resulting in a flare of symptoms. Currently have epigastric pain/burning, heartburn, and dysphagia. He is currently taking Protonix 40mg at night with little relief.    Past Medical History:   Diagnosis Date    Anxiety     GERD (gastroesophageal reflux disease)     H. pylori infection 2021    Hyperlipidemia        Past Surgical History:   Procedure Laterality Date    COLONOSCOPY      2 YRS    ENDOSCOPY N/A 09/11/2023    Procedure: ESOPHAGOGASTRODUODENOSCOPY with biopsies;  Surgeon: Luis Brown MD;  Location: formerly [facility];  Service: Gastroenterology;  Laterality: N/A;  gastric inlet patch, hiatal hernia    UPPER GASTROINTESTINAL ENDOSCOPY           Current Outpatient Medications:     atorvastatin (LIPITOR) 10 MG tablet, TAKE 1 TABLET BY MOUTH EVERY DAY IN THE EVENING, Disp: 90 tablet, Rfl: 1    Cholecalciferol (Vitamin D3) 50 MCG (2000 UT) capsule, Take 1 capsule by mouth Daily., Disp: , Rfl:     diclofenac (VOLTAREN) 75 MG EC tablet, Take 1 tablet by mouth 2 (Two) Times a Day., Disp: 60 tablet, Rfl: 2     fexofenadine (Allegra Allergy) 180 MG tablet, Take 1 tablet by mouth Daily., Disp: 90 tablet, Rfl: 3    levothyroxine (SYNTHROID, LEVOTHROID) 25 MCG tablet, Take 1 tablet by mouth Every Morning., Disp: 30 tablet, Rfl: 2    meclizine (ANTIVERT) 25 MG tablet, Take 1 tablet by mouth 3 (Three) Times a Day As Needed for Dizziness., Disp: 90 tablet, Rfl: 2    Omega-3 Fatty Acids (OMEGA 3 500 PO), Take  by mouth., Disp: , Rfl:     Turmeric (QC TUMERIC COMPLEX PO), Take  by mouth., Disp: , Rfl:     azithromycin (Zithromax Z-Pete) 250 MG tablet, Take 2 tablets by mouth on day 1, then 1 tablet daily on days 2-5 (Patient not taking: Reported on 11/8/2024), Disp: 6 tablet, Rfl: 0    brompheniramine-pseudoephedrine-DM 30-2-10 MG/5ML syrup, Take 7 mL by mouth 4 (Four) Times a Day As Needed for Cough or Congestion. (Patient not taking: Reported on 11/8/2024), Disp: 118 mL, Rfl: 0    nystatin (MYCOSTATIN) 100,000 unit/mL suspension, Swish and swallow 5 mL 4 (Four) Times a Day. (Patient not taking: Reported on 11/8/2024), Disp: 60 mL, Rfl: 0    predniSONE (DELTASONE) 20 MG tablet, Take 1 tablet by mouth Daily. (Patient not taking: Reported on 11/8/2024), Disp: 5 tablet, Rfl: 0    Vonoprazan Fumarate (Voquezna) 20 MG tablet, Take 1 tablet by mouth Daily., Disp: 90 tablet, Rfl: 1     No Known Allergies    Family History   Problem Relation Age of Onset    No Known Problems Mother     Colon cancer Father     Cancer Father     No Known Problems Sister     No Known Problems Brother     No Known Problems Maternal Aunt     No Known Problems Paternal Aunt     No Known Problems Maternal Uncle     No Known Problems Paternal Uncle     No Known Problems Maternal Grandfather     No Known Problems Maternal Grandmother     No Known Problems Paternal Grandfather     No Known Problems Paternal Grandmother     No Known Problems Cousin     No Known Problems Other     ADD / ADHD Neg Hx     Alcohol abuse Neg Hx     Anxiety disorder Neg Hx     Bipolar  "disorder Neg Hx     Dementia Neg Hx     Depression Neg Hx     Drug abuse Neg Hx     OCD Neg Hx     Paranoid behavior Neg Hx     Schizophrenia Neg Hx     Seizures Neg Hx     Self-Injurious Behavior  Neg Hx     Suicide Attempts Neg Hx         Social History     Social History Narrative    Not on file       Objective       Vital Signs:   /76 (BP Location: Left arm, Patient Position: Sitting, Cuff Size: Adult)   Pulse 75   Ht 167.6 cm (65.98\")   Wt 71.7 kg (158 lb)   SpO2 100%   BMI 25.51 kg/m²       Physical Exam  Constitutional:       Appearance: Normal appearance. He is normal weight.   HENT:      Head: Normocephalic and atraumatic.      Nose: Nose normal.   Pulmonary:      Effort: Pulmonary effort is normal.   Skin:     General: Skin is warm and dry.   Neurological:      Mental Status: He is alert and oriented to person, place, and time. Mental status is at baseline.   Psychiatric:         Mood and Affect: Mood normal.         Behavior: Behavior normal.         Thought Content: Thought content normal.         Judgment: Judgment normal.         Result Review :       CBC w/diff          12/8/2023    08:08 4/26/2024    08:58 7/29/2024    07:55   CBC w/Diff   WBC 6.41  5.45  5.71    RBC 5.63  5.51  5.73    Hemoglobin 15.5  14.7  15.7    Hematocrit 46.6  45.1  47.3    MCV 82.8  81.9  82.5    MCH 27.5  26.7  27.4    MCHC 33.3  32.6  33.2    RDW 13.1  13.0  12.6    Platelets 232  222  239    Neutrophil Rel % 50.8  50.0  54.8    Immature Granulocyte Rel % 0.3  0.2  0.5    Lymphocyte Rel % 37.6  39.8  34.3    Monocyte Rel % 7.5  7.0  7.7    Eosinophil Rel % 3.3  2.6  2.3    Basophil Rel % 0.5  0.4  0.4      CMP          12/8/2023    08:08 4/26/2024    08:58 7/29/2024    07:55   CMP   Glucose 108  95  101    BUN 10  9  10    Creatinine 0.99  0.97  1.03    EGFR 97.5  100.0  93.0    Sodium 139  140  140    Potassium 4.3  4.0  4.1    Chloride 104  105  103    Calcium 9.4  9.1  9.1    Total Protein 7.3  7.2  7.7  "   Albumin 4.6  4.6  4.6    Globulin 2.7  2.6  3.1    Total Bilirubin 0.5  0.4  0.4    Alkaline Phosphatase 94  81  75    AST (SGOT) 22  14  20    ALT (SGPT) 30  22  24    Albumin/Globulin Ratio 1.7  1.8  1.5    BUN/Creatinine Ratio 10.1  9.3  9.7    Anion Gap 8.0  8.0  10.9              Assessment and Plan    Diagnoses and all orders for this visit:    1. Gastroesophageal reflux disease, unspecified whether esophagitis present (Primary)  -     Case Request; Standing  -     Follow Anesthesia Guidelines / Protocol; Standing  -     Verify NPO; Standing  -     Case Request    2. Epigastric pain  -     Case Request; Standing  -     Follow Anesthesia Guidelines / Protocol; Standing  -     Verify NPO; Standing  -     Case Request  -     Comprehensive Metabolic Panel; Future  -     Amylase; Future  -     Lipase; Future    Other orders  -     Vonoprazan Fumarate (Voquezna) 20 MG tablet; Take 1 tablet by mouth Daily.  Dispense: 90 tablet; Refill: 1    Trial of Voquezna  Labs listed above.   Denies cardiopulmonary history  EGD Surgical Risk and Benefits: Possible risk/complications, benefits, and alternatives to surgical or invasive procedure have been explained to patient and/or legal guardian. Risks include bleeding, infection, and perforation. Patient has been evaluated and can tolerate anesthesia and/or sedation. Risk, benefits, and alternatives to anesthesia and sedation have been explained to patient and/or legal guardian.       Follow Up   Return for follow up after procedure.  Patient was given instructions and counseling regarding his condition or for health maintenance advice. Please see specific information pulled into the AVS if appropriate.

## 2024-11-12 ENCOUNTER — OFFICE VISIT (OUTPATIENT)
Dept: FAMILY MEDICINE CLINIC | Facility: CLINIC | Age: 44
End: 2024-11-12
Payer: COMMERCIAL

## 2024-11-12 VITALS
TEMPERATURE: 97.6 F | OXYGEN SATURATION: 98 % | DIASTOLIC BLOOD PRESSURE: 70 MMHG | BODY MASS INDEX: 25.12 KG/M2 | SYSTOLIC BLOOD PRESSURE: 120 MMHG | HEART RATE: 95 BPM | HEIGHT: 66 IN | WEIGHT: 156.3 LBS

## 2024-11-12 DIAGNOSIS — E78.2 MIXED HYPERLIPIDEMIA: ICD-10-CM

## 2024-11-12 DIAGNOSIS — R53.83 FATIGUE, UNSPECIFIED TYPE: ICD-10-CM

## 2024-11-12 DIAGNOSIS — Z01.89 ROUTINE LAB DRAW: ICD-10-CM

## 2024-11-12 DIAGNOSIS — M25.562 CHRONIC PAIN OF BOTH KNEES: ICD-10-CM

## 2024-11-12 DIAGNOSIS — M25.561 CHRONIC PAIN OF BOTH KNEES: ICD-10-CM

## 2024-11-12 DIAGNOSIS — G89.29 CHRONIC PAIN OF BOTH KNEES: ICD-10-CM

## 2024-11-12 DIAGNOSIS — E55.9 VITAMIN D DEFICIENCY: ICD-10-CM

## 2024-11-12 DIAGNOSIS — F41.9 ANXIETY: ICD-10-CM

## 2024-11-12 DIAGNOSIS — E03.9 ACQUIRED HYPOTHYROIDISM: ICD-10-CM

## 2024-11-12 DIAGNOSIS — K21.9 GASTROESOPHAGEAL REFLUX DISEASE WITHOUT ESOPHAGITIS: Primary | ICD-10-CM

## 2024-11-12 PROCEDURE — 99214 OFFICE O/P EST MOD 30 MIN: CPT

## 2024-11-12 RX ORDER — VONOPRAZAN FUMARATE 26.72 MG/1
20 TABLET ORAL DAILY
Qty: 20 TABLET | Refills: 0 | COMMUNITY
Start: 2024-11-12

## 2024-11-12 NOTE — PROGRESS NOTES
"Chief Complaint  No chief complaint on file.    Subjective        Missael Maloney presents to [facility]  History of Present Illness  Baldomero is being seen in the office today for his annual physical and he is having constant pain in his abdomen. He was taking Protonix daily but was told to stop due to his levothyroxine. He denies vomiting and nausea. But does report pain that starts in his abdomen and can radiate up into the esophagus and around his back.  He states that he has seen GI and they have started him on Voquenza 20mg daily, bu the has not started that yet because it was not ready at the pharmacy. He also stated that he has an upper scope with GI in December of this year.   He was questioning if there was a replacement for levothyroxine that did not have as many side effects.   Baldomero did state that he has a lot going on in his home due to his wife being diagnosed with breast cancer earlier this week. He was advised that his stress level could be contributing to his symptoms.   He did ask about his knee pain and his MRI of bilateral knees, which stated the exact wording for both left and right knee. He was questioning if that was correct. He was told that his knees had Baker's cysts on both sides. He states that he does not take anything for his pain, and he has changed his diet, which has helped lower the pain level.   He does not have any other concern at this time. Maria De Jesus Hernandez, CHAR Student         Objective   Vital Signs:  /70 (BP Location: Left arm, Patient Position: Sitting, Cuff Size: Adult)   Pulse 95   Temp 97.6 °F (36.4 °C)   Ht 167.6 cm (65.98\")   Wt 70.9 kg (156 lb 4.8 oz)   SpO2 98%   BMI 25.24 kg/m²   Estimated body mass index is 25.24 kg/m² as calculated from the following:    Height as of this encounter: 167.6 cm (65.98\").    Weight as of this encounter: 70.9 kg (156 lb 4.8 oz).            Physical Exam  Constitutional:       Appearance: " Normal appearance.   HENT:      Head: Normocephalic.   Cardiovascular:      Rate and Rhythm: Normal rate and regular rhythm.      Pulses: Normal pulses.      Heart sounds: Normal heart sounds.   Pulmonary:      Effort: Pulmonary effort is normal.      Breath sounds: Normal breath sounds.   Musculoskeletal:      Cervical back: Normal range of motion.   Skin:     General: Skin is warm and dry.   Neurological:      General: No focal deficit present.      Mental Status: He is alert and oriented to person, place, and time.   Psychiatric:         Mood and Affect: Mood normal.        Result Review :                Assessment and Plan   Diagnoses and all orders for this visit:    1. Gastroesophageal reflux disease without esophagitis (Primary)  Comments:  Continue per GI, start Voquenza as they instructed. Provided samples in office as patient has not been able to get from pharmacy.  Orders:  -     Vonoprazan Fumarate (Voquezna) 20 MG tablet; Take 1 tablet by mouth Daily.  Dispense: 20 tablet; Refill: 0    2. Acquired hypothyroidism  -     TSH Rfx On Abnormal To Free T4; Future    3. Chronic pain of both knees  Comments:  Continue to follow-up with ortho    4. Anxiety  Comments:  Worsened due to situation, coping well, consider medication if needed.    5. Vitamin D deficiency  -     Vitamin D,25-Hydroxy; Future    6. Mixed hyperlipidemia  -     Lipid Panel; Future    7. Routine lab draw  -     CBC Auto Differential; Future  -     Urinalysis With Culture If Indicated -; Future    8. Fatigue, unspecified type  -     Vitamin B12 & Folate; Future             Follow Up   No follow-ups on file.  Patient was given instructions and counseling regarding his condition or for health maintenance advice. Please see specific information pulled into the AVS if appropriate.

## 2024-11-14 ENCOUNTER — LAB (OUTPATIENT)
Dept: LAB | Facility: HOSPITAL | Age: 44
End: 2024-11-14
Payer: COMMERCIAL

## 2024-11-14 DIAGNOSIS — Z01.89 ROUTINE LAB DRAW: ICD-10-CM

## 2024-11-14 DIAGNOSIS — E78.2 MIXED HYPERLIPIDEMIA: ICD-10-CM

## 2024-11-14 DIAGNOSIS — E03.9 ACQUIRED HYPOTHYROIDISM: ICD-10-CM

## 2024-11-14 DIAGNOSIS — E55.9 VITAMIN D DEFICIENCY: ICD-10-CM

## 2024-11-14 DIAGNOSIS — R53.83 FATIGUE, UNSPECIFIED TYPE: ICD-10-CM

## 2024-11-14 LAB
25(OH)D3 SERPL-MCNC: 39.4 NG/ML (ref 30–100)
BASOPHILS # BLD AUTO: 0.02 10*3/MM3 (ref 0–0.2)
BASOPHILS NFR BLD AUTO: 0.3 % (ref 0–1.5)
BILIRUB UR QL STRIP: NEGATIVE
CHOLEST SERPL-MCNC: 120 MG/DL (ref 0–200)
CLARITY UR: CLEAR
COLOR UR: YELLOW
DEPRECATED RDW RBC AUTO: 39.2 FL (ref 37–54)
EOSINOPHIL # BLD AUTO: 0.12 10*3/MM3 (ref 0–0.4)
EOSINOPHIL NFR BLD AUTO: 2.1 % (ref 0.3–6.2)
ERYTHROCYTE [DISTWIDTH] IN BLOOD BY AUTOMATED COUNT: 13 % (ref 12.3–15.4)
FOLATE SERPL-MCNC: 10.9 NG/ML (ref 4.78–24.2)
GLUCOSE UR STRIP-MCNC: NEGATIVE MG/DL
HCT VFR BLD AUTO: 45 % (ref 37.5–51)
HDLC SERPL-MCNC: 34 MG/DL (ref 40–60)
HGB BLD-MCNC: 14.9 G/DL (ref 13–17.7)
HGB UR QL STRIP.AUTO: NEGATIVE
HOLD SPECIMEN: NORMAL
IMM GRANULOCYTES # BLD AUTO: 0.01 10*3/MM3 (ref 0–0.05)
IMM GRANULOCYTES NFR BLD AUTO: 0.2 % (ref 0–0.5)
KETONES UR QL STRIP: NEGATIVE
LDLC SERPL CALC-MCNC: 67 MG/DL (ref 0–100)
LDLC/HDLC SERPL: 1.94 {RATIO}
LEUKOCYTE ESTERASE UR QL STRIP.AUTO: NEGATIVE
LYMPHOCYTES # BLD AUTO: 2.02 10*3/MM3 (ref 0.7–3.1)
LYMPHOCYTES NFR BLD AUTO: 35.1 % (ref 19.6–45.3)
MCH RBC QN AUTO: 27.7 PG (ref 26.6–33)
MCHC RBC AUTO-ENTMCNC: 33.1 G/DL (ref 31.5–35.7)
MCV RBC AUTO: 83.6 FL (ref 79–97)
MONOCYTES # BLD AUTO: 0.42 10*3/MM3 (ref 0.1–0.9)
MONOCYTES NFR BLD AUTO: 7.3 % (ref 5–12)
NEUTROPHILS NFR BLD AUTO: 3.17 10*3/MM3 (ref 1.7–7)
NEUTROPHILS NFR BLD AUTO: 55 % (ref 42.7–76)
NITRITE UR QL STRIP: NEGATIVE
NRBC BLD AUTO-RTO: 0 /100 WBC (ref 0–0.2)
PH UR STRIP.AUTO: 6 [PH] (ref 5–8)
PLATELET # BLD AUTO: 232 10*3/MM3 (ref 140–450)
PMV BLD AUTO: 10.7 FL (ref 6–12)
PROT UR QL STRIP: NEGATIVE
RBC # BLD AUTO: 5.38 10*6/MM3 (ref 4.14–5.8)
SP GR UR STRIP: 1.02 (ref 1–1.03)
TRIGL SERPL-MCNC: 101 MG/DL (ref 0–150)
TSH SERPL DL<=0.05 MIU/L-ACNC: 2.42 UIU/ML (ref 0.27–4.2)
UROBILINOGEN UR QL STRIP: NORMAL
VIT B12 BLD-MCNC: 411 PG/ML (ref 211–946)
VLDLC SERPL-MCNC: 19 MG/DL (ref 5–40)
WBC NRBC COR # BLD AUTO: 5.76 10*3/MM3 (ref 3.4–10.8)

## 2024-11-14 PROCEDURE — 85025 COMPLETE CBC W/AUTO DIFF WBC: CPT

## 2024-11-14 PROCEDURE — 82607 VITAMIN B-12: CPT

## 2024-11-14 PROCEDURE — 81003 URINALYSIS AUTO W/O SCOPE: CPT

## 2024-11-14 PROCEDURE — 82746 ASSAY OF FOLIC ACID SERUM: CPT

## 2024-11-14 PROCEDURE — 36415 COLL VENOUS BLD VENIPUNCTURE: CPT

## 2024-11-14 PROCEDURE — 84443 ASSAY THYROID STIM HORMONE: CPT

## 2024-11-14 PROCEDURE — 82306 VITAMIN D 25 HYDROXY: CPT

## 2024-11-14 PROCEDURE — 80061 LIPID PANEL: CPT

## 2024-11-18 ENCOUNTER — OFFICE VISIT (OUTPATIENT)
Dept: ORTHOPEDIC SURGERY | Facility: CLINIC | Age: 44
End: 2024-11-18
Payer: COMMERCIAL

## 2024-11-18 VITALS
DIASTOLIC BLOOD PRESSURE: 86 MMHG | BODY MASS INDEX: 24.43 KG/M2 | HEIGHT: 66 IN | HEART RATE: 86 BPM | SYSTOLIC BLOOD PRESSURE: 123 MMHG | OXYGEN SATURATION: 98 % | WEIGHT: 152 LBS

## 2024-11-18 DIAGNOSIS — M25.562 PAIN IN BOTH KNEES, UNSPECIFIED CHRONICITY: Primary | ICD-10-CM

## 2024-11-18 DIAGNOSIS — M25.561 PAIN IN BOTH KNEES, UNSPECIFIED CHRONICITY: Primary | ICD-10-CM

## 2024-11-18 PROCEDURE — 99213 OFFICE O/P EST LOW 20 MIN: CPT | Performed by: PHYSICIAN ASSISTANT

## 2024-11-18 NOTE — PROGRESS NOTES
"Chief Complaint  Follow-up of the Left Knee and Follow-up of the Right Knee    Subjective          History of Present Illness      Missael Maloney is a 44 y.o. male  presents to [facility] for     Patient presents for follow-up evaluation of bilateral knee pain and to review bilateral knee MRIs.  Patient states he has changed his diet and that his knees are doing better with diet changes.  He feels less inflammation.  At last visit he had concern for his knee pain and we ordered MRIs he is here to review this.      No Known Allergies     Social History     Socioeconomic History    Marital status:    Tobacco Use    Smoking status: Never     Passive exposure: Never    Smokeless tobacco: Never   Vaping Use    Vaping status: Never Used   Substance and Sexual Activity    Alcohol use: Never    Drug use: Never    Sexual activity: Defer        REVIEW OF SYSTEMS    Constitutional: Awake alert and oriented x3, no acute distress, denies fevers, chills, weight loss  Respiratory: No respiratory distress  Vascular: Brisk cap refill, Intact distal pulses, No cyanosis, compartments soft with no signs or symptoms of compartment syndrome or DVT.   Cardiovascular: Denies chest pain, shortness of breath  Skin: Denies rashes, acute skin changes  Neurologic: Denies headache, loss of consciousness  MSK: Bilateral knee pain      Objective   Vital Signs:   /86   Pulse 86   Ht 167.6 cm (65.98\")   Wt 68.9 kg (152 lb)   SpO2 98%   BMI 24.55 kg/m²     Body mass index is 24.55 kg/m².    Physical Exam       Right lower extremity: full extension, flexion to 130 degrees, no effusion, no swelling,  stable to varus/valgus stress, stable to anterior/posterior drawer, negative Lachman's, negative  Reid's, non tender to the medial joint line  and lateral joint line, neurovascularly intact, calf soft, positive EHL, FHL, GS, and TA. Sensation intact to all 5 nerves of the foot.      Left lower " extremity: full extension, flexion to 130 degrees, stable to varus/valgus stress, stable to anterior/posterior drawer , negative  Lachman's, negative  Reid's,  non tender to the medial joint line  and lateral joint line, neurovascularly intact, calf soft, positive EHL, FHL, GS, and TA. Sensation intact to all 5 nerves of the foot.       Procedures    Imaging Results (Most Recent)       None           MRI Knee Left Without Contrast    Result Date: 10/30/2024  Narrative: MRI KNEE LEFT  WO CONTRAST Date of Exam: 10/29/2024 6:25 AM EDT Indication: LEFT KNEE PAIN,.  Comparison: 7/29/2024 radiographs Technique:  Routine multiplanar/multisequence images of the left knee were obtained without contrast administration. Findings: Intact medial and lateral menisci. Anterior and posterior cruciate ligaments are intact. The medial collateral ligament is intact. The distal iliotibial band is intact. The lateral collateral ligamentous complex is intact. The extensor mechanism is intact. There is trace joint fluid. Tiny Baker's cyst. Tricompartmental articular cartilage is intact. There is no evidence of fracture or suspicious bone lesion. Remaining visualized soft tissues are within normal limits.     Impression: Impression: Unremarkable MRI of the left knee. Intact menisci and ligamentous structures. Electronically Signed: Marvin Akins MD  10/30/2024 4:33 PM EDT  Workstation ID: [id number]    MRI Knee Right Without Contrast    Result Date: 10/30/2024  Narrative: MRI KNEE RIGHT  WO CONTRAST Date of Exam: 10/29/2024 6:20 AM EDT Indication: RIGHT KNEE PAIN.  Comparison: 7/29/2024 Technique:  Routine multiplanar/multisequence images of the right knee were obtained without contrast administration. Findings: Medial and lateral menisci are intact. Anterior and posterior cruciate ligaments are intact. The medial collateral ligament is intact. Distal iliotibial band is intact. Lateral collateral ligamentous complex is intact. Extensor  mechanism is intact. Trace joint fluid. Tiny Baker's cyst. Tricompartmental articular cartilage is intact. There is no evidence of fracture or suspicious bone lesion. Remaining visualized soft tissues are within normal limits.     Impression: Impression: Unremarkable MRI of the right knee. Intact menisci and ligamentous structures. Electronically Signed: Marvin Akins MD  10/30/2024 4:31 PM EDT  Workstation ID: [id number]     Result Review :   The following data was reviewed by: DAGMAR Ty on 11/18/2024:  Data reviewed : Radiologic studies reviewed by me with the patient              Assessment and Plan    Diagnoses and all orders for this visit:    1. Pain in both knees, unspecified chronicity (Primary)        Reviewed MRIs with the patient discussed diagnosis and treatment options with him he was advised to continue activity as tolerated follow-up as needed, patient agreed    Call or return if worsening symptoms.    Follow Up   Return if symptoms worsen or fail to improve.  Patient was given instructions and counseling regarding his condition or for health maintenance advice. Please see specific information pulled into the AVS if appropriate.       EMR Dragon/Transcription disclaimer:  Part of this note may be an electronic transcription/translation of spoken language to printed text using the Dragon Dictation System

## 2024-11-20 RX ORDER — LEVOTHYROXINE SODIUM 25 UG/1
25 TABLET ORAL EVERY MORNING
Qty: 90 TABLET | Refills: 1 | Status: SHIPPED | OUTPATIENT
Start: 2024-11-20

## 2024-12-02 ENCOUNTER — ANESTHESIA EVENT (OUTPATIENT)
Dept: GASTROENTEROLOGY | Facility: HOSPITAL | Age: 44
End: 2024-12-02
Payer: COMMERCIAL

## 2024-12-02 NOTE — ANESTHESIA PREPROCEDURE EVALUATION
Anesthesia Evaluation     Nursing notes reviewed   NPO Solid Status: > 8 hours  NPO Liquid Status: > 4 hours           Airway   Mallampati: II  TM distance: <3 FB  Neck ROM: full  No difficulty expected  Dental - normal exam         Pulmonary - negative pulmonary ROS and normal exam    breath sounds clear to auscultation  Cardiovascular - normal exam  Exercise tolerance: good (4-7 METS)    ECG reviewed  Rhythm: regular  Rate: normal    (+) hyperlipidemia    ROS comment:     EKG 01/2023   NORMAL ECG -  Sinus rhythm  When compared with ECG of 06-Jan-2023 18:54:19,  No significant change      Neuro/Psych  (+) dizziness/light headedness (VERTIGO), psychiatric history Anxiety  GI/Hepatic/Renal/Endo    (+) GERD well controlled    Musculoskeletal (-) negative ROS    Abdominal    Substance History - negative use     OB/GYN negative ob/gyn ROS         Other - negative ROS       ROS/Med Hx Other: GERD, epigastric pain    LAST EGD 09/2023    EKG 01/06/23: HR 77, SR                  Anesthesia Plan    ASA 2     general   total IV anesthesia  (Total IV Anesthesia    Patient understands anesthesia not responsible for dental damage.  )  intravenous induction     Anesthetic plan, risks, benefits, and alternatives have been provided, discussed and informed consent has been obtained with: patient and spouse/significant other.  Pre-procedure education provided  Plan discussed with CRNA.      CODE STATUS:

## 2024-12-02 NOTE — PRE-PROCEDURE INSTRUCTIONS
"Instructed on date and arrival time of 1200. Instructed that arrival time is not procedure time but allows time to prepare for procedure. Come to entrance \"C\".  Must have  over age 18 to drive home.  May have two visitors; however, children under 12 must stay in waiting room.  Discussed diet/NPO.  May take medications as usual except for blood thinners, diabetic medications, or weight loss medications.  Verbalized understanding of instructions given.  Instructed to call for questions or concerns.  "

## 2024-12-03 ENCOUNTER — ANESTHESIA (OUTPATIENT)
Dept: GASTROENTEROLOGY | Facility: HOSPITAL | Age: 44
End: 2024-12-03
Payer: COMMERCIAL

## 2024-12-03 ENCOUNTER — HOSPITAL ENCOUNTER (OUTPATIENT)
Facility: HOSPITAL | Age: 44
Setting detail: HOSPITAL OUTPATIENT SURGERY
Discharge: HOME | End: 2024-12-03
Attending: INTERNAL MEDICINE | Admitting: INTERNAL MEDICINE
Payer: COMMERCIAL

## 2024-12-03 VITALS
SYSTOLIC BLOOD PRESSURE: 119 MMHG | WEIGHT: 154.32 LBS | TEMPERATURE: 98 F | DIASTOLIC BLOOD PRESSURE: 81 MMHG | OXYGEN SATURATION: 99 % | HEART RATE: 72 BPM | BODY MASS INDEX: 24.92 KG/M2 | RESPIRATION RATE: 19 BRPM

## 2024-12-03 DIAGNOSIS — R10.13 EPIGASTRIC PAIN: ICD-10-CM

## 2024-12-03 DIAGNOSIS — K21.9 GASTROESOPHAGEAL REFLUX DISEASE, UNSPECIFIED WHETHER ESOPHAGITIS PRESENT: ICD-10-CM

## 2024-12-03 PROCEDURE — 25010000002 PROPOFOL 10 MG/ML EMULSION: Performed by: NURSE ANESTHETIST, CERTIFIED REGISTERED

## 2024-12-03 PROCEDURE — 88305 TISSUE EXAM BY PATHOLOGIST: CPT | Performed by: INTERNAL MEDICINE

## 2024-12-03 PROCEDURE — 25010000002 LIDOCAINE PF 2% 2 % SOLUTION: Performed by: NURSE ANESTHETIST, CERTIFIED REGISTERED

## 2024-12-03 RX ORDER — PANTOPRAZOLE SODIUM 40 MG/1
40 TABLET, DELAYED RELEASE ORAL DAILY
Qty: 30 TABLET | Refills: 5 | Status: SHIPPED | OUTPATIENT
Start: 2024-12-03

## 2024-12-03 RX ORDER — SODIUM CHLORIDE, SODIUM LACTATE, POTASSIUM CHLORIDE, CALCIUM CHLORIDE 600; 310; 30; 20 MG/100ML; MG/100ML; MG/100ML; MG/100ML
30 INJECTION, SOLUTION INTRAVENOUS CONTINUOUS
Status: DISCONTINUED | OUTPATIENT
Start: 2024-12-03 | End: 2024-12-03 | Stop reason: HOSPADM

## 2024-12-03 RX ORDER — LIDOCAINE HYDROCHLORIDE 20 MG/ML
INJECTION, SOLUTION EPIDURAL; INFILTRATION; INTRACAUDAL; PERINEURAL AS NEEDED
Status: DISCONTINUED | OUTPATIENT
Start: 2024-12-03 | End: 2024-12-03 | Stop reason: SURG

## 2024-12-03 RX ORDER — PROPOFOL 10 MG/ML
VIAL (ML) INTRAVENOUS AS NEEDED
Status: DISCONTINUED | OUTPATIENT
Start: 2024-12-03 | End: 2024-12-03 | Stop reason: SURG

## 2024-12-03 RX ADMIN — PROPOFOL 30 MG: 10 INJECTION, EMULSION INTRAVENOUS at 14:18

## 2024-12-03 RX ADMIN — PROPOFOL 100 MG: 10 INJECTION, EMULSION INTRAVENOUS at 14:16

## 2024-12-03 RX ADMIN — LIDOCAINE HYDROCHLORIDE 100 MG: 20 INJECTION, SOLUTION EPIDURAL; INFILTRATION; INTRACAUDAL; PERINEURAL at 14:16

## 2024-12-03 RX ADMIN — PROPOFOL 30 MG: 10 INJECTION, EMULSION INTRAVENOUS at 14:20

## 2024-12-03 NOTE — ANESTHESIA POSTPROCEDURE EVALUATION
Patient: Missael Maloney    Procedure Summary       Date: 12/03/24 Room / Location: [facility] ENDOSCOPY 4 / [facility] ENDOSCOPY    Anesthesia Start: 1413 Anesthesia Stop: 1428    Procedure: ESOPHAGOGASTRODUODENOSCOPY WITH BIOPSIES Diagnosis:       Gastroesophageal reflux disease, unspecified whether esophagitis present      Epigastric pain      (Gastroesophageal reflux disease, unspecified whether esophagitis present [K21.9])      (Epigastric pain [R10.13])    Surgeons: Luis Brown MD Provider: Yunior Lora Jr., CRNA    Anesthesia Type: general ASA Status: 2            Anesthesia Type: general    Vitals  Vitals Value Taken Time   /76 12/03/24 1433   Temp 36.3 °C (97.4 °F) 12/03/24 1427   Pulse 78 12/03/24 1435   Resp 20 12/03/24 1432   SpO2 99 % 12/03/24 1435   Vitals shown include unfiled device data.        Post Anesthesia Care and Evaluation    Patient location during evaluation: bedside  Patient participation: complete - patient participated  Level of consciousness: awake  Pain management: adequate    Airway patency: patent  Anesthetic complications: No anesthetic complications  PONV Status: controlled  Cardiovascular status: acceptable and stable  Respiratory status: acceptable

## 2024-12-03 NOTE — H&P
Pre Procedure History & Physical    Chief Complaint:   Upper abdominal pain and persistent heartburn and dysphagia    Subjective     HPI:   As above upper abdominal pain heartburn    Past Medical History:   Past Medical History:   Diagnosis Date    Anxiety     GERD (gastroesophageal reflux disease)     H. pylori infection 2021    Hyperlipidemia        Past Surgical History:  Past Surgical History:   Procedure Laterality Date    COLONOSCOPY      2 YRS    ENDOSCOPY N/A 09/11/2023    Procedure: ESOPHAGOGASTRODUODENOSCOPY with biopsies;  Surgeon: Luis Brown MD;  Location: [facility] ENDOSCOPY;  Service: Gastroenterology;  Laterality: N/A;  gastric inlet patch, hiatal hernia    UPPER GASTROINTESTINAL ENDOSCOPY         Family History:  Family History   Problem Relation Age of Onset    No Known Problems Mother     Colon cancer Father     Cancer Father     No Known Problems Sister     No Known Problems Brother     No Known Problems Maternal Aunt     No Known Problems Paternal Aunt     No Known Problems Maternal Uncle     No Known Problems Paternal Uncle     No Known Problems Maternal Grandfather     No Known Problems Maternal Grandmother     No Known Problems Paternal Grandfather     No Known Problems Paternal Grandmother     No Known Problems Cousin     No Known Problems Other     ADD / ADHD Neg Hx     Alcohol abuse Neg Hx     Anxiety disorder Neg Hx     Bipolar disorder Neg Hx     Dementia Neg Hx     Depression Neg Hx     Drug abuse Neg Hx     OCD Neg Hx     Paranoid behavior Neg Hx     Schizophrenia Neg Hx     Seizures Neg Hx     Self-Injurious Behavior  Neg Hx     Suicide Attempts Neg Hx        Social History:   reports that he has never smoked. He has never been exposed to tobacco smoke. He has never used smokeless tobacco. He reports that he does not drink alcohol and does not use drugs.    Medications:   Medications Prior to Admission   Medication Sig Dispense Refill Last Dose/Taking    atorvastatin (LIPITOR)  10 MG tablet TAKE 1 TABLET BY MOUTH EVERY DAY IN THE EVENING 90 tablet 1 12/2/2024    Cholecalciferol (Vitamin D3) 50 MCG (2000 UT) capsule Take 1 capsule by mouth Daily.   12/2/2024    levothyroxine (SYNTHROID, LEVOTHROID) 25 MCG tablet TAKE 1 TABLET BY MOUTH EVERY DAY IN THE MORNING 90 tablet 1 12/3/2024    Omega-3 Fatty Acids (OMEGA 3 500 PO) Take  by mouth.   12/2/2024    Turmeric (QC TUMERIC COMPLEX PO) Take  by mouth.   12/2/2024    Vonoprazan Fumarate (Voquezna) 20 MG tablet Take 1 tablet by mouth Daily. 90 tablet 1 12/2/2024    fexofenadine (Allegra Allergy) 180 MG tablet Take 1 tablet by mouth Daily. 90 tablet 3 More than a month       Allergies:  Patient has no known allergies.        Objective     Blood pressure 124/83, pulse 73, temperature 97.4 °F (36.3 °C), temperature source Temporal, resp. rate 19, weight 70 kg (154 lb 5.2 oz), SpO2 100%.    Physical Exam   Constitutional: Pt is oriented to person, place, and time and well-developed, well-nourished, and in no distress.   Mouth/Throat: Oropharynx is clear and moist.   Neck: Normal range of motion.   Cardiovascular: Normal rate, regular rhythm and normal heart sounds.    Pulmonary/Chest: Effort normal and breath sounds normal.   Abdominal: Soft. Nontender  Skin: Skin is warm and dry.   Psychiatric: Mood, memory, affect and judgment normal.     Assessment & Plan     Diagnosis:  Upper abdominal pain and heartburn    Anticipated Surgical Procedure:  EGD    The risks, benefits, and alternatives of this procedure have been discussed with the patient or the responsible party- the patient understands and agrees to proceed.

## 2024-12-05 LAB
CYTO UR: NORMAL
LAB AP CASE REPORT: NORMAL
LAB AP CLINICAL INFORMATION: NORMAL
PATH REPORT.FINAL DX SPEC: NORMAL
PATH REPORT.GROSS SPEC: NORMAL

## 2024-12-06 ENCOUNTER — PATIENT MESSAGE (OUTPATIENT)
Dept: GASTROENTEROLOGY | Facility: CLINIC | Age: 44
End: 2024-12-06
Payer: COMMERCIAL

## 2024-12-06 DIAGNOSIS — R19.4 CHANGE IN BOWEL HABITS: ICD-10-CM

## 2024-12-06 DIAGNOSIS — R10.13 EPIGASTRIC PAIN: Primary | ICD-10-CM

## 2024-12-11 ENCOUNTER — PREP FOR SURGERY (OUTPATIENT)
Dept: OTHER | Facility: HOSPITAL | Age: 44
End: 2024-12-11
Payer: COMMERCIAL

## 2024-12-11 NOTE — TELEPHONE ENCOUNTER
As pt has not improved with Voquezna, per MA he has reported continued abd pain and weight loss. Will place orders for CT , labs, and colonoscopy.

## 2024-12-12 ENCOUNTER — PREP FOR SURGERY (OUTPATIENT)
Dept: OTHER | Facility: HOSPITAL | Age: 44
End: 2024-12-12
Payer: COMMERCIAL

## 2024-12-12 DIAGNOSIS — Z80.0 FAMILY HISTORY OF COLON CANCER IN FATHER: Primary | ICD-10-CM

## 2024-12-12 DIAGNOSIS — R63.4 WEIGHT LOSS: ICD-10-CM

## 2024-12-12 DIAGNOSIS — R19.4 CHANGE IN BOWEL HABITS: ICD-10-CM

## 2024-12-13 ENCOUNTER — LAB (OUTPATIENT)
Dept: LAB | Facility: HOSPITAL | Age: 44
End: 2024-12-13
Payer: COMMERCIAL

## 2024-12-13 DIAGNOSIS — R10.13 EPIGASTRIC PAIN: ICD-10-CM

## 2024-12-13 DIAGNOSIS — R19.4 CHANGE IN BOWEL HABITS: ICD-10-CM

## 2024-12-13 LAB
ALBUMIN SERPL-MCNC: 4.4 G/DL (ref 3.5–5.2)
ALBUMIN/GLOB SERPL: 1.6 G/DL
ALP SERPL-CCNC: 74 U/L (ref 39–117)
ALT SERPL W P-5'-P-CCNC: 18 U/L (ref 1–41)
AMYLASE SERPL-CCNC: 73 U/L (ref 28–100)
ANION GAP SERPL CALCULATED.3IONS-SCNC: 6 MMOL/L (ref 5–15)
AST SERPL-CCNC: 15 U/L (ref 1–40)
BILIRUB SERPL-MCNC: 0.6 MG/DL (ref 0–1.2)
BUN SERPL-MCNC: 14 MG/DL (ref 6–20)
BUN/CREAT SERPL: 13.7 (ref 7–25)
CALCIUM SPEC-SCNC: 9.5 MG/DL (ref 8.6–10.5)
CHLORIDE SERPL-SCNC: 106 MMOL/L (ref 98–107)
CO2 SERPL-SCNC: 28 MMOL/L (ref 22–29)
CREAT SERPL-MCNC: 1.02 MG/DL (ref 0.76–1.27)
DEPRECATED RDW RBC AUTO: 39 FL (ref 37–54)
EGFRCR SERPLBLD CKD-EPI 2021: 93.5 ML/MIN/1.73
ERYTHROCYTE [DISTWIDTH] IN BLOOD BY AUTOMATED COUNT: 12.8 % (ref 12.3–15.4)
GLOBULIN UR ELPH-MCNC: 2.8 GM/DL
GLUCOSE SERPL-MCNC: 113 MG/DL (ref 65–99)
HCT VFR BLD AUTO: 45.9 % (ref 37.5–51)
HGB BLD-MCNC: 15.3 G/DL (ref 13–17.7)
LIPASE SERPL-CCNC: 52 U/L (ref 13–60)
MCH RBC QN AUTO: 27.9 PG (ref 26.6–33)
MCHC RBC AUTO-ENTMCNC: 33.3 G/DL (ref 31.5–35.7)
MCV RBC AUTO: 83.8 FL (ref 79–97)
PLATELET # BLD AUTO: 237 10*3/MM3 (ref 140–450)
PMV BLD AUTO: 10.5 FL (ref 6–12)
POTASSIUM SERPL-SCNC: 4.2 MMOL/L (ref 3.5–5.2)
PROT SERPL-MCNC: 7.2 G/DL (ref 6–8.5)
RBC # BLD AUTO: 5.48 10*6/MM3 (ref 4.14–5.8)
SODIUM SERPL-SCNC: 140 MMOL/L (ref 136–145)
WBC NRBC COR # BLD AUTO: 5.75 10*3/MM3 (ref 3.4–10.8)

## 2024-12-13 PROCEDURE — 85027 COMPLETE CBC AUTOMATED: CPT

## 2024-12-13 PROCEDURE — 36415 COLL VENOUS BLD VENIPUNCTURE: CPT

## 2024-12-13 PROCEDURE — 80053 COMPREHEN METABOLIC PANEL: CPT

## 2024-12-13 PROCEDURE — 82150 ASSAY OF AMYLASE: CPT

## 2024-12-13 PROCEDURE — 83690 ASSAY OF LIPASE: CPT

## 2024-12-16 ENCOUNTER — TELEPHONE (OUTPATIENT)
Dept: GASTROENTEROLOGY | Facility: CLINIC | Age: 44
End: 2024-12-16
Payer: COMMERCIAL

## 2024-12-16 RX ORDER — ESCITALOPRAM OXALATE 5 MG/1
5 TABLET ORAL DAILY
Qty: 30 TABLET | Refills: 2 | Status: SHIPPED | OUTPATIENT
Start: 2024-12-16

## 2024-12-16 NOTE — TELEPHONE ENCOUNTER
----- Message from April C sent at 12/5/2024 10:04 AM EST -----    ----- Message -----  From: Fiona Sorto APRN  Sent: 12/5/2024  10:03 AM EST  To: [facility] [place] [facility]    I have reviewed the patients upper endoscopy and pathology. Esophageal biopsies normal. Stomach biopsies benign. Biopsies are negative for H. Pylori, dysplasia, metaplasia, and malignancy. Recommend continuing Protonix 40mg daily. If still having persistent symptoms then I recommend CT for further evaluation.

## 2024-12-17 ENCOUNTER — TELEPHONE (OUTPATIENT)
Dept: GASTROENTEROLOGY | Facility: CLINIC | Age: 44
End: 2024-12-17
Payer: COMMERCIAL

## 2024-12-17 ENCOUNTER — CLINICAL SUPPORT (OUTPATIENT)
Dept: FAMILY MEDICINE CLINIC | Facility: CLINIC | Age: 44
End: 2024-12-17
Payer: COMMERCIAL

## 2024-12-17 DIAGNOSIS — F33.1 MAJOR DEPRESSIVE DISORDER, RECURRENT EPISODE, MODERATE DEGREE: ICD-10-CM

## 2024-12-17 DIAGNOSIS — F41.9 ANXIETY: Primary | ICD-10-CM

## 2024-12-17 NOTE — TELEPHONE ENCOUNTER
"Hub staff attempted to follow warm transfer process and was unsuccessful     Caller: Missael rFeeman \"CHAO\"    Relationship to patient: Self    Best call back number: [phone]    Patient is needing: PT IS RETURNING A MISS CALL FROM MA APRIL. PLEASE GIVE PT A CALL BACK AND IF NOT ABLE TO REACH PT. IT IS OKAY TO [facility].       "

## 2024-12-20 ENCOUNTER — ANESTHESIA EVENT (OUTPATIENT)
Dept: GASTROENTEROLOGY | Facility: HOSPITAL | Age: 44
End: 2024-12-20
Payer: COMMERCIAL

## 2024-12-20 NOTE — ANESTHESIA PREPROCEDURE EVALUATION
Anesthesia Evaluation     Patient summary reviewed and Nursing notes reviewed                Airway   Dental      Pulmonary - negative pulmonary ROS   Cardiovascular     (+) hyperlipidemia      Neuro/Psych  (+) dizziness/light headedness, psychiatric history Anxiety  GI/Hepatic/Renal/Endo    (+) GERD    Musculoskeletal (-) negative ROS    Abdominal    Substance History - negative use     OB/GYN negative ob/gyn ROS         Other        ROS/Med Hx Other: EKG 1/6/23    . Sinus rhythm . When compared with ECG of [id number], . No significant change                      Anesthesia Plan    ASA 2     general   total IV anesthesia  (Total IV Anesthesia    Patient understands anesthesia not responsible for dental damage.  )  intravenous induction     Anesthetic plan, risks, benefits, and alternatives have been provided, discussed and informed consent has been obtained with: patient.    Plan discussed with CRNA.        CODE STATUS:

## 2024-12-23 ENCOUNTER — ANESTHESIA (OUTPATIENT)
Dept: GASTROENTEROLOGY | Facility: HOSPITAL | Age: 44
End: 2024-12-23
Payer: COMMERCIAL

## 2024-12-23 ENCOUNTER — HOSPITAL ENCOUNTER (OUTPATIENT)
Facility: HOSPITAL | Age: 44
Setting detail: HOSPITAL OUTPATIENT SURGERY
Discharge: HOME | End: 2024-12-23
Attending: INTERNAL MEDICINE | Admitting: INTERNAL MEDICINE
Payer: COMMERCIAL

## 2024-12-23 VITALS
SYSTOLIC BLOOD PRESSURE: 106 MMHG | TEMPERATURE: 97 F | HEART RATE: 60 BPM | RESPIRATION RATE: 16 BRPM | WEIGHT: 151.46 LBS | DIASTOLIC BLOOD PRESSURE: 72 MMHG | OXYGEN SATURATION: 98 % | BODY MASS INDEX: 24.34 KG/M2 | HEIGHT: 66 IN

## 2024-12-23 PROCEDURE — 25010000002 LIDOCAINE PF 2% 2 % SOLUTION: Performed by: NURSE ANESTHETIST, CERTIFIED REGISTERED

## 2024-12-23 PROCEDURE — 25010000002 PROPOFOL 10 MG/ML EMULSION: Performed by: NURSE ANESTHETIST, CERTIFIED REGISTERED

## 2024-12-23 RX ORDER — PROPOFOL 10 MG/ML
VIAL (ML) INTRAVENOUS AS NEEDED
Status: DISCONTINUED | OUTPATIENT
Start: 2024-12-23 | End: 2024-12-23 | Stop reason: SURG

## 2024-12-23 RX ORDER — LIDOCAINE HYDROCHLORIDE 20 MG/ML
INJECTION, SOLUTION EPIDURAL; INFILTRATION; INTRACAUDAL; PERINEURAL AS NEEDED
Status: DISCONTINUED | OUTPATIENT
Start: 2024-12-23 | End: 2024-12-23 | Stop reason: SURG

## 2024-12-23 RX ADMIN — LIDOCAINE HYDROCHLORIDE 100 MG: 20 INJECTION, SOLUTION EPIDURAL; INFILTRATION; INTRACAUDAL; PERINEURAL at 13:36

## 2024-12-23 RX ADMIN — PROPOFOL 250 MCG/KG/MIN: 10 INJECTION, EMULSION INTRAVENOUS at 13:36

## 2024-12-23 RX ADMIN — PROPOFOL 100 MG: 10 INJECTION, EMULSION INTRAVENOUS at 13:36

## 2024-12-23 NOTE — H&P
Pre Procedure History & Physical    Chief Complaint:   Otilia history colon cancer    Subjective     HPI:   Family history of colon cancer    Past Medical History:   Past Medical History:   Diagnosis Date    Anxiety     GERD (gastroesophageal reflux disease)     H. pylori infection 2021    Hyperlipidemia     Hypothyroidism        Past Surgical History:  Past Surgical History:   Procedure Laterality Date    COLONOSCOPY      2 YRS    ENDOSCOPY N/A 09/11/2023    Procedure: ESOPHAGOGASTRODUODENOSCOPY with biopsies;  Surgeon: Luis Brown MD;  Location: [facility] ENDOSCOPY;  Service: Gastroenterology;  Laterality: N/A;  gastric inlet patch, hiatal hernia    ENDOSCOPY N/A 12/3/2024    Procedure: ESOPHAGOGASTRODUODENOSCOPY WITH BIOPSIES;  Surgeon: Luis Brown MD;  Location: [facility] ENDOSCOPY;  Service: Gastroenterology;  Laterality: N/A;  GASTRIC INLET PATCH    UPPER GASTROINTESTINAL ENDOSCOPY         Family History:  Family History   Problem Relation Age of Onset    No Known Problems Mother     Colon cancer Father     Cancer Father     No Known Problems Sister     No Known Problems Brother     No Known Problems Maternal Aunt     No Known Problems Paternal Aunt     No Known Problems Maternal Uncle     No Known Problems Paternal Uncle     No Known Problems Maternal Grandfather     No Known Problems Maternal Grandmother     No Known Problems Paternal Grandfather     No Known Problems Paternal Grandmother     No Known Problems Cousin     No Known Problems Other     ADD / ADHD Neg Hx     Alcohol abuse Neg Hx     Anxiety disorder Neg Hx     Bipolar disorder Neg Hx     Dementia Neg Hx     Depression Neg Hx     Drug abuse Neg Hx     OCD Neg Hx     Paranoid behavior Neg Hx     Schizophrenia Neg Hx     Seizures Neg Hx     Self-Injurious Behavior  Neg Hx     Suicide Attempts Neg Hx        Social History:   reports that he has never smoked. He has never been exposed to tobacco smoke. He has never used smokeless tobacco.  He reports that he does not drink alcohol and does not use drugs.    Medications:   Medications Prior to Admission   Medication Sig Dispense Refill Last Dose/Taking    atorvastatin (LIPITOR) 10 MG tablet TAKE 1 TABLET BY MOUTH EVERY DAY IN THE EVENING 90 tablet 1 12/22/2024    Cholecalciferol (Vitamin D3) 50 MCG (2000 UT) capsule Take 1 capsule by mouth Daily.   12/22/2024    escitalopram (Lexapro) 5 MG tablet Take 1 tablet by mouth Daily. 30 tablet 2 12/22/2024    fexofenadine (Allegra Allergy) 180 MG tablet Take 1 tablet by mouth Daily. 90 tablet 3 Past Week    levothyroxine (SYNTHROID, LEVOTHROID) 25 MCG tablet TAKE 1 TABLET BY MOUTH EVERY DAY IN THE MORNING 90 tablet 1 12/23/2024    Omega-3 Fatty Acids (OMEGA 3 500 PO) Take  by mouth.   12/22/2024    pantoprazole (PROTONIX) 40 MG EC tablet Take 1 tablet by mouth Daily. 30 tablet 5 12/22/2024    Turmeric (QC TUMERIC COMPLEX PO) Take  by mouth.   Past Week       Allergies:  Patient has no known allergies.        Objective     Weight 68.7 kg (151 lb 7.3 oz).    Physical Exam   Constitutional: Pt is oriented to person, place, and time and well-developed, well-nourished, and in no distress.   Mouth/Throat: Oropharynx is clear and moist.   Neck: Normal range of motion.   Cardiovascular: Normal rate, regular rhythm and normal heart sounds.    Pulmonary/Chest: Effort normal and breath sounds normal.   Abdominal: Soft. Nontender  Skin: Skin is warm and dry.   Psychiatric: Mood, memory, affect and judgment normal.     Assessment & Plan     Diagnosis:  Family history of colon cancer    Anticipated Surgical Procedure:  Colonoscopy    The risks, benefits, and alternatives of this procedure have been discussed with the patient or the responsible party- the patient understands and agrees to proceed.

## 2024-12-23 NOTE — ANESTHESIA POSTPROCEDURE EVALUATION
Patient: Missael Mlaoney    Procedure Summary       Date: 12/23/24 Room / Location: [facility] ENDOSCOPY 1 / [facility] ENDOSCOPY    Anesthesia Start: 1333 Anesthesia Stop: 1404    Procedure: COLONOSCOPY Diagnosis:       Family history of colon cancer in father      Weight loss      Change in bowel habits      (Family history of colon cancer in father [Z80.0])      (Weight loss [R63.4])      (Change in bowel habits [R19.4])    Surgeons: Luis Brown MD Provider: Senthil Breen CRNA    Anesthesia Type: general ASA Status: 2            Anesthesia Type: general    Vitals  No vitals data found for the desired time range.          Post Anesthesia Care and Evaluation    Post-procedure mental status: acceptable.  Pain management: satisfactory to patient    Airway patency: patent  Anesthetic complications: No anesthetic complications    Cardiovascular status: acceptable  Respiratory status: acceptable    Comments: Per chart review

## 2024-12-27 ENCOUNTER — HOSPITAL ENCOUNTER (OUTPATIENT)
Dept: CT IMAGING | Facility: HOSPITAL | Age: 44
Discharge: HOME | End: 2024-12-27
Admitting: NURSE PRACTITIONER
Payer: COMMERCIAL

## 2024-12-27 DIAGNOSIS — R10.13 EPIGASTRIC PAIN: ICD-10-CM

## 2024-12-27 DIAGNOSIS — R19.4 CHANGE IN BOWEL HABITS: ICD-10-CM

## 2024-12-27 PROCEDURE — 25510000001 IOPAMIDOL PER 1 ML: Performed by: NURSE PRACTITIONER

## 2024-12-27 PROCEDURE — 74177 CT ABD & PELVIS W/CONTRAST: CPT

## 2024-12-27 RX ORDER — IOPAMIDOL 755 MG/ML
100 INJECTION, SOLUTION INTRAVASCULAR
Status: COMPLETED | OUTPATIENT
Start: 2024-12-27 | End: 2024-12-27

## 2024-12-27 RX ADMIN — IOPAMIDOL 100 ML: 755 INJECTION, SOLUTION INTRAVENOUS at 10:41

## 2024-12-30 NOTE — PROGRESS NOTES
No acute findings are seen   Nothing found to explain pt's symptoms  Keep f/u  Will copy pt's PCP as well

## 2024-12-31 ENCOUNTER — TELEPHONE (OUTPATIENT)
Dept: GASTROENTEROLOGY | Facility: CLINIC | Age: 44
End: 2024-12-31
Payer: COMMERCIAL

## 2024-12-31 NOTE — TELEPHONE ENCOUNTER
Colonoscopy 12/23/2024: Good prep, internal hemorrhoids grade 1 repeat colon in 5 years, send letter  Keep scheduled follow-up appointment

## 2024-12-31 NOTE — TELEPHONE ENCOUNTER
----- Message from Gabrielle Breen sent at 12/30/2024 11:54 AM EST -----  No acute findings are seen   Nothing found to explain pt's symptoms  Keep f/u  Will copy pt's PCP as well

## 2025-01-10 ENCOUNTER — TELEMEDICINE (OUTPATIENT)
Dept: PSYCHIATRY | Facility: CLINIC | Age: 45
End: 2025-01-10
Payer: COMMERCIAL

## 2025-01-10 DIAGNOSIS — F41.1 GENERALIZED ANXIETY DISORDER: Primary | ICD-10-CM

## 2025-01-10 DIAGNOSIS — F51.05 INSOMNIA DUE TO MENTAL CONDITION: ICD-10-CM

## 2025-01-10 DIAGNOSIS — F33.1 MAJOR DEPRESSIVE DISORDER, RECURRENT EPISODE, MODERATE: ICD-10-CM

## 2025-01-10 DIAGNOSIS — F41.0 PANIC ATTACKS: ICD-10-CM

## 2025-01-10 RX ORDER — ESCITALOPRAM OXALATE 10 MG/1
10 TABLET ORAL DAILY
Qty: 90 TABLET | Refills: 1 | Status: SHIPPED | OUTPATIENT
Start: 2025-01-10

## 2025-01-10 RX ORDER — TRAZODONE HYDROCHLORIDE 50 MG/1
50 TABLET, FILM COATED ORAL NIGHTLY
Qty: 30 TABLET | Refills: 2 | Status: SHIPPED | OUTPATIENT
Start: 2025-01-10

## 2025-01-10 NOTE — PROGRESS NOTES
"Subjective   Missael Maloney is a 44 y.o. male who presents today for initial evaluation     Referring Provider:  No referring provider defined for this encounter.    Chief Complaint:  Anxiety    History of Present Illness:     Chart review:     Noam: blank  Care Everywhere: several notes, none for [facility] health    Psychotropic medication chart review:  Present:  None    Previously:  None    EK/23: 78, sinus, qtc 434  Procedures: none  Head imaging: none  Labs:  : gluc 108 otherwise reassuring cmp/cbc. Tsh elev 4.5, fT4 reassuring. High trigs, low hdl.       Chart notes: Seen 10/23. Anxious, wants to speak with a counselor, psychiatrist (possibly). Referred to us.     Chart review:  01/10/2025: Not seen in over a year 2023.    Plannin23: Declines meds at this time, preferring psychotherapy first. Fear of getting cancer like his father. Not happy with [place] ([facility] 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 Has to do radiation.  I'm worried about the stomach issues coming back.  I have thyroid issues recently diagnosed  I started lexapro recently, which has helped  MDD: depressed mood  THOMAS: excessive worrying, on edge, restless  Panic attacks: yes  Energy: down  Concentration: down  Insomnia: maintenance insomnia  Eating/Weight: 151 lbs  Refills: y  Substances: def  Therapy: interested  Medication compliant: y  SE: n  No SI HI AVH.      23: In person.  Interview:  Chart review:   His/Her Story: \"I got some complaint about body tics.\"  P10, G3  I also clench my jaws  I also worry, and anxiety  I tried yoga, meditation  I'd like to avoid medicine first, and try psychotherapy  Mom had " possible OCD, cured with medication  I like my job, family  I don't like living in [place], [place] town. I'm used to a big city.  I never had to worry about money before (in [place]). Maybe it's because of the little one.  Worries:  Financial  His child: her future, how he is raising her  If something happens to us -- what happens to her?  Depression/Mood: minimal  Anxiety:  Uncontrolled worrying, muscle tension, fatigue, feeling on edge or restless, irritability.  Severity: mild to moderate  Duration: since 2017, when moved to [place] from [place]  Panic attacks: n  Psych ROS: Positive for depression, anxiety.  Negative for psychosis and nael.  ADHD: def  PTSD: def  No SI HI AVH.  Medication compliant: y      Access to Firearms: denies    PHQ-9 Depression Screening  PHQ-9 Total Score:      Little interest or pleasure in doing things?     Feeling down, depressed, or hopeless?     Trouble falling or staying asleep, or sleeping too much?     Feeling tired or having little energy?     Poor appetite or overeating?     Feeling bad about yourself - or that you are a failure or have let yourself or your family down?     Trouble concentrating on things, such as reading the newspaper or watching television?     Moving or speaking so slowly that other people could have noticed? Or the opposite - being so fidgety or restless that you have been moving around a lot more than usual?     Thoughts that you would be better off dead, or of hurting yourself in some way?     PHQ-9 Total Score       THOMAS-7  Feeling nervous, anxious or on edge: (Patient-Rptd) Nearly every day  Not being able to stop or control worrying: (Patient-Rptd) More than half the days  Worrying too much about different things: (Patient-Rptd) Nearly every day  Trouble Relaxing: (Patient-Rptd) More than half the days  Being so restless that it is hard to sit still: (Patient-Rptd) More than half the days  Feeling afraid as if something awful might happen:  (Patient-Rptd) Nearly every day  Becoming easily annoyed or irritable: (Patient-Rptd) More than half the days  THOMAS 7 Total Score: (Patient-Rptd) 17  If you checked any problems, how difficult have these problems made it for you to do your work, take care of things at home, or get along with other people: (Patient-Rptd) Somewhat difficult    Past Surgical History:  Past Surgical History:   Procedure Laterality Date    COLONOSCOPY      2 YRS    COLONOSCOPY N/A 12/23/2024    Procedure: COLONOSCOPY;  Surgeon: Luis Brown MD;  Location: [facility] ENDOSCOPY;  Service: Gastroenterology;  Laterality: N/A;  HEMORRHOIDS AND SKIN TAGS    ENDOSCOPY N/A 09/11/2023    Procedure: ESOPHAGOGASTRODUODENOSCOPY with biopsies;  Surgeon: Luis Brown MD;  Location: [facility] ENDOSCOPY;  Service: Gastroenterology;  Laterality: N/A;  gastric inlet patch, hiatal hernia    ENDOSCOPY N/A 12/3/2024    Procedure: ESOPHAGOGASTRODUODENOSCOPY WITH BIOPSIES;  Surgeon: Luis Brown MD;  Location: [facility] ENDOSCOPY;  Service: Gastroenterology;  Laterality: N/A;  GASTRIC INLET PATCH    UPPER GASTROINTESTINAL ENDOSCOPY         Problem List:  Patient Active Problem List   Diagnosis    Mixed hyperlipidemia    Gastroesophageal reflux disease without esophagitis    History of Helicobacter pylori infection    Family history of colon cancer in father    Vertigo    Gastroesophageal reflux disease    Epigastric pain    Pain in both knees    Weight loss    Change in bowel habits       Allergy:   No Known Allergies     Discontinued Medications:  Medications Discontinued During This Encounter   Medication Reason    escitalopram (Lexapro) 5 MG tablet Reorder       Current Medications:   Current Outpatient Medications   Medication Sig Dispense Refill    escitalopram (Lexapro) 10 MG tablet Take 1 tablet by mouth Daily. 90 tablet 1    atorvastatin (LIPITOR) 10 MG tablet TAKE 1 TABLET BY MOUTH EVERY DAY IN THE EVENING 90 tablet 1    Cholecalciferol  (Vitamin D3) 50 MCG (2000 UT) capsule Take 1 capsule by mouth Daily.      fexofenadine (Allegra Allergy) 180 MG tablet Take 1 tablet by mouth Daily. 90 tablet 3    levothyroxine (SYNTHROID, LEVOTHROID) 25 MCG tablet TAKE 1 TABLET BY MOUTH EVERY DAY IN THE MORNING 90 tablet 1    Omega-3 Fatty Acids (OMEGA 3 500 PO) Take  by mouth.      pantoprazole (PROTONIX) 40 MG EC tablet Take 1 tablet by mouth Daily. 30 tablet 5    traZODone (DESYREL) 50 MG tablet Take 1 tablet by mouth Every Night. 30 tablet 2    Turmeric (QC TUMERIC COMPLEX PO) Take  by mouth.       No current facility-administered medications for this visit.       Past Medical History:  Past Medical History:   Diagnosis Date    Anxiety     GERD (gastroesophageal reflux disease)     H. pylori infection 2021    Hyperlipidemia     Hypothyroidism        Past Psychiatric History:    Never seen by psychiatrist, no admissions, SA, meds    Substance Abuse History:   Types:Denies all, including illicit  Withdrawal Symptoms:Denies  Longest Period Sober:Not Applicable   AA: Not applicable     Social History:  Martial Status:  Employed:Yes  Kids:Yes  House:Lives in a house   History: Denies    Social History     Socioeconomic History    Marital status:    Tobacco Use    Smoking status: Never     Passive exposure: Never    Smokeless tobacco: Never   Vaping Use    Vaping status: Never Used   Substance and Sexual Activity    Alcohol use: Never    Drug use: Never    Sexual activity: Defer       Family History:   Suicide Attempts: Denies  Suicide Completions:Denies      Family History   Problem Relation Age of Onset    No Known Problems Mother     Colon cancer Father     Cancer Father     No Known Problems Sister     No Known Problems Brother     No Known Problems Maternal Aunt     No Known Problems Paternal Aunt     No Known Problems Maternal Uncle     No Known Problems Paternal Uncle     No Known Problems Maternal Grandfather     No Known Problems  "Maternal Grandmother     No Known Problems Paternal Grandfather     No Known Problems Paternal Grandmother     No Known Problems Cousin     No Known Problems Other     ADD / ADHD Neg Hx     Alcohol abuse Neg Hx     Anxiety disorder Neg Hx     Bipolar disorder Neg Hx     Dementia Neg Hx     Depression Neg Hx     Drug abuse Neg Hx     OCD Neg Hx     Paranoid behavior Neg Hx     Schizophrenia Neg Hx     Seizures Neg Hx     Self-Injurious Behavior  Neg Hx     Suicide Attempts Neg Hx        Developmental History:     Childhood: Denies Abuse, \"great childhood\" I started reading and writing at 4 yo  High School:Completed  College: yes    Mental Status Exam  Appearance  : groomed, good eye contact, normal street clothes  Behavior  : pleasant and cooperative  Motor  : No abnormal  Speech  : accent, talkative, normal rhythm, rate, volume, tone, not hyperverbal, not pressured, normal prosidy  Mood  : \"it destroyed me\"  Affect  : mild depression, mood congruent, good variability  Thought Content  : negative suicidal ideations, negative homicidal ideations, negative obsessions  Perceptions  : negative auditory hallucinations, negative visual hallucinations  Thought Process  : linear  Insight/Judgement  : Fair/fair  Cognition  : grossly intact  Attention   : intact      Review of Systems:  Review of Systems   Constitutional:  Negative for diaphoresis and fatigue.   HENT:  Negative for drooling.    Eyes:  Positive for visual disturbance.   Respiratory:  Positive for cough. Negative for shortness of breath.    Cardiovascular:  Positive for chest pain. Negative for palpitations and leg swelling.   Gastrointestinal:  Negative for nausea and vomiting.   Endocrine: Positive for heat intolerance. Negative for cold intolerance.   Genitourinary:  Negative for difficulty urinating.   Musculoskeletal:  Positive for joint swelling.   Allergic/Immunologic: Negative for immunocompromised state.   Neurological:  Negative for dizziness, " seizures, speech difficulty and numbness.       Physical Exam:  Physical Exam    Vital Signs:   There were no vitals taken for this visit.     Lab Results:   Lab on 12/13/2024   Component Date Value Ref Range Status    WBC 12/13/2024 5.75  3.40 - 10.80 10*3/mm3 Final    RBC 12/13/2024 5.48  4.14 - 5.80 10*6/mm3 Final    Hemoglobin 12/13/2024 15.3  13.0 - 17.7 g/dL Final    Hematocrit 12/13/2024 45.9  37.5 - 51.0 % Final    MCV 12/13/2024 83.8  79.0 - 97.0 fL Final    MCH 12/13/2024 27.9  26.6 - 33.0 pg Final    MCHC 12/13/2024 33.3  31.5 - 35.7 g/dL Final    RDW 12/13/2024 12.8  12.3 - 15.4 % Final    RDW-SD 12/13/2024 39.0  37.0 - 54.0 fl Final    MPV 12/13/2024 10.5  6.0 - 12.0 fL Final    Platelets 12/13/2024 237  140 - 450 10*3/mm3 Final    Glucose 12/13/2024 113 (H)  65 - 99 mg/dL Final    BUN 12/13/2024 14  6 - 20 mg/dL Final    Creatinine 12/13/2024 1.02  0.76 - 1.27 mg/dL Final    Sodium 12/13/2024 140  136 - 145 mmol/L Final    Potassium 12/13/2024 4.2  3.5 - 5.2 mmol/L Final    Chloride 12/13/2024 106  98 - 107 mmol/L Final    CO2 12/13/2024 28.0  22.0 - 29.0 mmol/L Final    Calcium 12/13/2024 9.5  8.6 - 10.5 mg/dL Final    Total Protein 12/13/2024 7.2  6.0 - 8.5 g/dL Final    Albumin 12/13/2024 4.4  3.5 - 5.2 g/dL Final    ALT (SGPT) 12/13/2024 18  1 - 41 U/L Final    AST (SGOT) 12/13/2024 15  1 - 40 U/L Final    Alkaline Phosphatase 12/13/2024 74  39 - 117 U/L Final    Total Bilirubin 12/13/2024 0.6  0.0 - 1.2 mg/dL Final    Globulin 12/13/2024 2.8  gm/dL Final    A/G Ratio 12/13/2024 1.6  g/dL Final    BUN/Creatinine Ratio 12/13/2024 13.7  7.0 - 25.0 Final    Anion Gap 12/13/2024 6.0  5.0 - 15.0 mmol/L Final    eGFR 12/13/2024 93.5  >60.0 mL/min/1.73 Final    Amylase 12/13/2024 73  28 - 100 U/L Final    Lipase 12/13/2024 52  13 - 60 U/L Final   Admission on 12/03/2024, Discharged on 12/03/2024   Component Date Value Ref Range Status    Case Report 12/03/2024    Final                    Value:Surgical  "Pathology Report                         Case: [id number]                                  Authorizing Provider:  Luis Brown MD    Collected:           12/03/2024 02:21 PM          Ordering Location:     [facility] Received:            12/04/2024 07:48 AM                                 SUITES                                                                       Pathologist:           Alexus Logan MD                                                     Specimens:   1) - Gastric, Antrum, GASTRIC ANTRUM AND BODY BIOPSIES                                              2) - Esophagus, Distal, DISTAL ESOPHAGUS BIOPSY                                            Clinical Information 12/03/2024    Final                    Value:Gastroesophageal reflux disease, unspecified whether esophagitis present  Epigastric pain      Final Diagnosis 12/03/2024    Final                    Value:1. Stomach, antrum and body, biopsy:   - Gastric antrum and body/fundus mucosa with mild chronic inactive gastritis   - Negative for Helicobacter pylori on routine H&E stain   - Negative for intestinal metaplasia, dysplasia and malignancy      2. Distal esophagus, biopsy:   - Squamous mucosa with no significant pathologic change   - Negative for intestinal metaplasia, dysplasia and malignancy            Gross Description 12/03/2024    Final                    Value:1. Gastric, Antrum.  Received in formalin and labeled \" gastric antrum and body\" is a 0.7 cm aggregate of tan soft tissue fragments. The specimen is entirely submitted in one cassette.    2. Esophagus, Distal.  Received in formalin and labeled \" distal esophagus\" are three fragments of tan soft tissue measuring 0.2-0.3 cm in greatest dimension. The specimen is entirely submitted in one cassette.   CHARLEEN      Microscopic Description 12/03/2024    Final                    Value:Microscopic examination performed.     Lab on 11/14/2024   Component Date Value Ref " Range Status    WBC 11/14/2024 5.76  3.40 - 10.80 10*3/mm3 Final    RBC 11/14/2024 5.38  4.14 - 5.80 10*6/mm3 Final    Hemoglobin 11/14/2024 14.9  13.0 - 17.7 g/dL Final    Hematocrit 11/14/2024 45.0  37.5 - 51.0 % Final    MCV 11/14/2024 83.6  79.0 - 97.0 fL Final    MCH 11/14/2024 27.7  26.6 - 33.0 pg Final    MCHC 11/14/2024 33.1  31.5 - 35.7 g/dL Final    RDW 11/14/2024 13.0  12.3 - 15.4 % Final    RDW-SD 11/14/2024 39.2  37.0 - 54.0 fl Final    MPV 11/14/2024 10.7  6.0 - 12.0 fL Final    Platelets 11/14/2024 232  140 - 450 10*3/mm3 Final    Neutrophil % 11/14/2024 55.0  42.7 - 76.0 % Final    Lymphocyte % 11/14/2024 35.1  19.6 - 45.3 % Final    Monocyte % 11/14/2024 7.3  5.0 - 12.0 % Final    Eosinophil % 11/14/2024 2.1  0.3 - 6.2 % Final    Basophil % 11/14/2024 0.3  0.0 - 1.5 % Final    Immature Grans % 11/14/2024 0.2  0.0 - 0.5 % Final    Neutrophils, Absolute 11/14/2024 3.17  1.70 - 7.00 10*3/mm3 Final    Lymphocytes, Absolute 11/14/2024 2.02  0.70 - 3.10 10*3/mm3 Final    Monocytes, Absolute 11/14/2024 0.42  0.10 - 0.90 10*3/mm3 Final    Eosinophils, Absolute 11/14/2024 0.12  0.00 - 0.40 10*3/mm3 Final    Basophils, Absolute 11/14/2024 0.02  0.00 - 0.20 10*3/mm3 Final    Immature Grans, Absolute 11/14/2024 0.01  0.00 - 0.05 10*3/mm3 Final    nRBC 11/14/2024 0.0  0.0 - 0.2 /100 WBC Final    Total Cholesterol 11/14/2024 120  0 - 200 mg/dL Final    Triglycerides 11/14/2024 101  0 - 150 mg/dL Final    HDL Cholesterol 11/14/2024 34 (L)  40 - 60 mg/dL Final    LDL Cholesterol  11/14/2024 67  0 - 100 mg/dL Final    VLDL Cholesterol 11/14/2024 19  5 - 40 mg/dL Final    LDL/HDL Ratio 11/14/2024 1.94   Final    TSH 11/14/2024 2.420  0.270 - 4.200 uIU/mL Final    Folate 11/14/2024 10.90  4.78 - 24.20 ng/mL Final    Vitamin B-12 11/14/2024 411  211 - 946 pg/mL Final    25 Hydroxy, Vitamin D 11/14/2024 39.4  30.0 - 100.0 ng/ml Final    Color, UA 11/14/2024 Yellow  Yellow, Straw Final    Appearance, UA 11/14/2024 Clear   Clear Final    pH, UA 11/14/2024 6.0  5.0 - 8.0 Final    Specific Gravity, UA 11/14/2024 1.017  1.005 - 1.030 Final    Glucose, UA 11/14/2024 Negative  Negative Final    Ketones, UA 11/14/2024 Negative  Negative Final    Bilirubin, UA 11/14/2024 Negative  Negative Final    Blood, UA 11/14/2024 Negative  Negative Final    Protein, UA 11/14/2024 Negative  Negative Final    Leuk Esterase, UA 11/14/2024 Negative  Negative Final    Nitrite, UA 11/14/2024 Negative  Negative Final    Urobilinogen, UA 11/14/2024 0.2 E.U./dL  0.2 - 1.0 E.U./dL Final    Extra Tube 11/14/2024 Hold for add-ons.   Final    Auto resulted.   Lab on 11/08/2024   Component Date Value Ref Range Status    Glucose 11/08/2024 97  65 - 99 mg/dL Final    BUN 11/08/2024 10  6 - 20 mg/dL Final    Creatinine 11/08/2024 0.94  0.76 - 1.27 mg/dL Final    Sodium 11/08/2024 143  136 - 145 mmol/L Final    Potassium 11/08/2024 4.5  3.5 - 5.2 mmol/L Final    Chloride 11/08/2024 106  98 - 107 mmol/L Final    CO2 11/08/2024 26.9  22.0 - 29.0 mmol/L Final    Calcium 11/08/2024 9.7  8.6 - 10.5 mg/dL Final    Total Protein 11/08/2024 7.5  6.0 - 8.5 g/dL Final    Albumin 11/08/2024 4.6  3.5 - 5.2 g/dL Final    ALT (SGPT) 11/08/2024 14  1 - 41 U/L Final    AST (SGOT) 11/08/2024 13  1 - 40 U/L Final    Alkaline Phosphatase 11/08/2024 76  39 - 117 U/L Final    Total Bilirubin 11/08/2024 0.3  0.0 - 1.2 mg/dL Final    Globulin 11/08/2024 2.9  gm/dL Final    A/G Ratio 11/08/2024 1.6  g/dL Final    BUN/Creatinine Ratio 11/08/2024 10.6  7.0 - 25.0 Final    Anion Gap 11/08/2024 10.1  5.0 - 15.0 mmol/L Final    eGFR 11/08/2024 103.2  >60.0 mL/min/1.73 Final    Amylase 11/08/2024 70  28 - 100 U/L Final    Lipase 11/08/2024 55  13 - 60 U/L Final   Lab on 10/04/2024   Component Date Value Ref Range Status    TSH 10/04/2024 1.330  0.270 - 4.200 uIU/mL Final   Lab on 07/29/2024   Component Date Value Ref Range Status    WBC 07/29/2024 5.71  3.40 - 10.80 10*3/mm3 Final    RBC 07/29/2024  5.73  4.14 - 5.80 10*6/mm3 Final    Hemoglobin 07/29/2024 15.7  13.0 - 17.7 g/dL Final    Hematocrit 07/29/2024 47.3  37.5 - 51.0 % Final    MCV 07/29/2024 82.5  79.0 - 97.0 fL Final    MCH 07/29/2024 27.4  26.6 - 33.0 pg Final    MCHC 07/29/2024 33.2  31.5 - 35.7 g/dL Final    RDW 07/29/2024 12.6  12.3 - 15.4 % Final    RDW-SD 07/29/2024 37.4  37.0 - 54.0 fl Final    MPV 07/29/2024 10.7  6.0 - 12.0 fL Final    Platelets 07/29/2024 239  140 - 450 10*3/mm3 Final    Neutrophil % 07/29/2024 54.8  42.7 - 76.0 % Final    Lymphocyte % 07/29/2024 34.3  19.6 - 45.3 % Final    Monocyte % 07/29/2024 7.7  5.0 - 12.0 % Final    Eosinophil % 07/29/2024 2.3  0.3 - 6.2 % Final    Basophil % 07/29/2024 0.4  0.0 - 1.5 % Final    Immature Grans % 07/29/2024 0.5  0.0 - 0.5 % Final    Neutrophils, Absolute 07/29/2024 3.13  1.70 - 7.00 10*3/mm3 Final    Lymphocytes, Absolute 07/29/2024 1.96  0.70 - 3.10 10*3/mm3 Final    Monocytes, Absolute 07/29/2024 0.44  0.10 - 0.90 10*3/mm3 Final    Eosinophils, Absolute 07/29/2024 0.13  0.00 - 0.40 10*3/mm3 Final    Basophils, Absolute 07/29/2024 0.02  0.00 - 0.20 10*3/mm3 Final    Immature Grans, Absolute 07/29/2024 0.03  0.00 - 0.05 10*3/mm3 Final    nRBC 07/29/2024 0.0  0.0 - 0.2 /100 WBC Final    Glucose 07/29/2024 101 (H)  65 - 99 mg/dL Final    BUN 07/29/2024 10  6 - 20 mg/dL Final    Creatinine 07/29/2024 1.03  0.76 - 1.27 mg/dL Final    Sodium 07/29/2024 140  136 - 145 mmol/L Final    Potassium 07/29/2024 4.1  3.5 - 5.2 mmol/L Final    Chloride 07/29/2024 103  98 - 107 mmol/L Final    CO2 07/29/2024 26.1  22.0 - 29.0 mmol/L Final    Calcium 07/29/2024 9.1  8.6 - 10.5 mg/dL Final    Total Protein 07/29/2024 7.7  6.0 - 8.5 g/dL Final    Albumin 07/29/2024 4.6  3.5 - 5.2 g/dL Final    ALT (SGPT) 07/29/2024 24  1 - 41 U/L Final    AST (SGOT) 07/29/2024 20  1 - 40 U/L Final    Alkaline Phosphatase 07/29/2024 75  39 - 117 U/L Final    Total Bilirubin 07/29/2024 0.4  0.0 - 1.2 mg/dL Final     Globulin 07/29/2024 3.1  gm/dL Final    A/G Ratio 07/29/2024 1.5  g/dL Final    BUN/Creatinine Ratio 07/29/2024 9.7  7.0 - 25.0 Final    Anion Gap 07/29/2024 10.9  5.0 - 15.0 mmol/L Final    eGFR 07/29/2024 93.0  >60.0 mL/min/1.73 Final    Total Cholesterol 07/29/2024 248 (H)  0 - 200 mg/dL Final    Triglycerides 07/29/2024 208 (H)  0 - 150 mg/dL Final    HDL Cholesterol 07/29/2024 36 (L)  40 - 60 mg/dL Final    LDL Cholesterol  07/29/2024 173 (H)  0 - 100 mg/dL Final    VLDL Cholesterol 07/29/2024 39  5 - 40 mg/dL Final    LDL/HDL Ratio 07/29/2024 4.73   Final    TSH 07/29/2024 5.260 (H)  0.270 - 4.200 uIU/mL Final    Folate 07/29/2024 16.80  4.78 - 24.20 ng/mL Final    Vitamin B-12 07/29/2024 397  211 - 946 pg/mL Final    25 Hydroxy, Vitamin D 07/29/2024 34.3  30.0 - 100.0 ng/ml Final    Thyroid Peroxidase Antibody 07/29/2024 <9  0 - 34 IU/mL Final    Thyroglobulin Ab 07/29/2024 <1.0  0.0 - 0.9 IU/mL Final    Thyroglobulin Antibody measured by [facility] Method[employer]  It should be noted that the presence of thyroglobulin antibodies  may not be pathogenic nor diagnostic, especially at very low  levels. The assay  has found that four percent of  individuals without evidence of thyroid disease or autoimmunity  will have positive TgAb levels up to 4 IU/mL.    Color, UA 07/29/2024 Yellow  Yellow, Straw Final    Appearance, UA 07/29/2024 Clear  Clear Final    pH, UA 07/29/2024 6.0  5.0 - 8.0 Final    Specific Gravity, UA 07/29/2024 1.025  1.005 - 1.030 Final    Glucose, UA 07/29/2024 Negative  Negative Final    Ketones, UA 07/29/2024 Trace (A)  Negative Final    Bilirubin, UA 07/29/2024 Negative  Negative Final    Blood, UA 07/29/2024 Negative  Negative Final    Protein, UA 07/29/2024 Negative  Negative Final    Leuk Esterase, UA 07/29/2024 Negative  Negative Final    Nitrite, UA 07/29/2024 Negative  Negative Final    Urobilinogen, UA 07/29/2024 1.0 E.U./dL  0.2 - 1.0 E.U./dL Final    Extra Tube  07/29/2024 Hold for add-ons.   Final    Auto resulted.    Free T4 07/29/2024 1.16  0.92 - 1.68 ng/dL Final       EKG Results:  No orders to display       Imaging Results:  No Images in the past 120 days found..      Assessment & Plan   Diagnoses and all orders for this visit:    1. Generalized anxiety disorder (Primary)  -     escitalopram (Lexapro) 10 MG tablet; Take 1 tablet by mouth Daily.  Dispense: 90 tablet; Refill: 1    2. Major depressive disorder, recurrent episode, moderate  -     escitalopram (Lexapro) 10 MG tablet; Take 1 tablet by mouth Daily.  Dispense: 90 tablet; Refill: 1    3. Panic attacks  -     escitalopram (Lexapro) 10 MG tablet; Take 1 tablet by mouth Daily.  Dispense: 90 tablet; Refill: 1    4. Insomnia due to mental condition  -     escitalopram (Lexapro) 10 MG tablet; Take 1 tablet by mouth Daily.  Dispense: 90 tablet; Refill: 1  -     traZODone (DESYREL) 50 MG tablet; Take 1 tablet by mouth Every Night.  Dispense: 30 tablet; Refill: 2        Visit Diagnoses:    ICD-10-CM ICD-9-CM   1. Generalized anxiety disorder  F41.1 300.02   2. Major depressive disorder, recurrent episode, moderate  F33.1 296.32   3. Panic attacks  F41.0 300.01   4. Insomnia due to mental condition  F51.05 300.9     327.02     01/10/2025: Not seen in over a year. Leaving for [place] in February so can't really start therapy at [place]. Increase lexapro for MDD THOMAS, start trazodone for insomnia. 3w    12/11/23: Declines meds at this time, preferring psychotherapy first. Fear of getting cancer like his father. Not happy with [facility] 3m    PLAN:  Safety: No acute safety concerns  Therapy: Referral Made [facility]  Risk Assessment: Risk of self-harm acutely is low to moderate.  Risk factors include anxiety disorder, and recent psychosocial stressors (pandemic). Protective factors include no family history, denies access to guns/weapons, no present SI, no history of suicide attempts or self-harm in the  past, minimal AODA, healthcare seeking, future orientation, willingness to engage in care.  Risk of self-harm chronically is also low to moderate, but could be further elevated in the event of treatment noncompliance and/or AODA.  Meds:   INCREASE lexapro 5 to 10 mg qday. Risks, benefits, alternatives discussed with patient including GI upset, nausea vomiting diarrhea, hyponatremia, theoretical decrease of seizure threshold predisposing the patient to a slightly higher seizure risk, headaches, sexual dysfunction, serotonin syndrome, bleeding risk, increased suicidality in patients 24 years and younger, switching to nael/hypomania.  After discussion of these risks and benefits, the patient voiced understanding and agreed to proceed.  START trazodone 50 mg qhs. Risks, benefits, side effects discussed with patient including GI upset, sedation, dizziness/falls risk, grogginess the following day, prolongation of the QTc interval.  Do not use before operating vehicle, vessel, or machine. After discussion of these risks and benefits, the patient voiced understanding and agreed to proceed.    Labs: none    Patient screened positive for depression based on a PHQ-9 score of 19 on 1/10/2025. Follow-up recommendations include: Referral to Mental Health specialist and Suicide Risk Assessment performed.           TREATMENT PLAN/GOALS: Continue supportive psychotherapy efforts and medications as indicated. Treatment and medication options discussed during today's visit. Patient acknowledged and verbally consented to continue with current treatment plan and was educated on the importance of compliance with treatment and follow-up appointments.    MEDICATION ISSUES:  JOY reviewed as expected.  Discussed medication options and treatment plan of prescribed medication as well as the risks, benefits, and side effects including potential falls, possible impaired driving and metabolic adversities among others. Patient is agreeable to  call the office with any worsening of symptoms or onset of side effects. Patient is agreeable to call 911 or go to the nearest ER should he/she begin having SI/HI. No medication side effects or related complaints today.     MEDS ORDERED DURING VISIT:  New Medications Ordered This Visit   Medications    escitalopram (Lexapro) 10 MG tablet     Sig: Take 1 tablet by mouth Daily.     Dispense:  90 tablet     Refill:  1     Replaces 5 mg dose. Thank you for the help. Please call with questions: [phone].    traZODone (DESYREL) 50 MG tablet     Sig: Take 1 tablet by mouth Every Night.     Dispense:  30 tablet     Refill:  2       Return in about 3 weeks (around 1/31/2025) for Video visit.         This document has been electronically signed by Raven Mclean MD  January 10, 2025 18:08 EST    Dictated Utilizing Dragon Dictation: Part of this note may be an electronic transcription/translation of spoken language to printed text using the Dragon Dictation System.

## 2025-01-10 NOTE — TREATMENT PLAN
Anxiety:  7/10 essentially first visit    Goals:  Patient will develop and implement behavioral and cognitive strategies to reduce anxiety and irrational fears, monthly, using Rogerian psychotherapy and CBT where appropriate.  Help patient explore past emotional issues in relation to present anxiety, monthly, until remission of symptoms, using Rogerian psychotherapy and CBT where appropriate.  Help patient develop an awareness of their cognitive and physical responses to anxiety, monthly, until remission of symptoms, using Rogerian psychotherapy and CBT where appropriate.          Depression:  7/10 essentially first visit    Goals:  Patient will demonstrate the ability to initiate new constructive life skills outside of sessions on a consistent basis, monthly, using Rogerian psychotherapy and CBT where appropriate.  Assist patient in setting attainable activities of daily living goals, monthly, using Rogerian psychotherapy and CBT where appropriate.  Provide education about depression, monthly, using Rogerian psychotherapy and CBT where appropriate.  Assist patient in developing healthy coping strategies, monthly, using Rogerian psychotherapy and CBT where appropriate.        Rogerian psychotherapy and CBT will be used to help accomplish the above goals and manage depression and anxiety related to family well-being, medical conditions        I have discussed and reviewed this treatment plan with the patient.      Reviewed by Raven Mclean MD   01/10/2025

## 2025-01-13 ENCOUNTER — TELEPHONE (OUTPATIENT)
Dept: FAMILY MEDICINE CLINIC | Facility: CLINIC | Age: 45
End: 2025-01-13
Payer: COMMERCIAL

## 2025-01-14 NOTE — TELEPHONE ENCOUNTER
Patient is aware that juan alberto reviewed his results and that she agrees with them medication he's on

## 2025-01-17 ENCOUNTER — OFFICE VISIT (OUTPATIENT)
Dept: FAMILY MEDICINE CLINIC | Facility: CLINIC | Age: 45
End: 2025-01-17
Payer: COMMERCIAL

## 2025-01-17 VITALS
SYSTOLIC BLOOD PRESSURE: 136 MMHG | BODY MASS INDEX: 24.94 KG/M2 | DIASTOLIC BLOOD PRESSURE: 74 MMHG | OXYGEN SATURATION: 99 % | WEIGHT: 155.2 LBS | HEIGHT: 66 IN | HEART RATE: 89 BPM | TEMPERATURE: 97.9 F

## 2025-01-17 DIAGNOSIS — R53.83 FATIGUE, UNSPECIFIED TYPE: ICD-10-CM

## 2025-01-17 DIAGNOSIS — R42 DIZZINESS: Primary | ICD-10-CM

## 2025-01-17 DIAGNOSIS — H53.9 VISION CHANGES: ICD-10-CM

## 2025-01-17 PROCEDURE — 99213 OFFICE O/P EST LOW 20 MIN: CPT | Performed by: NURSE PRACTITIONER

## 2025-01-17 RX ORDER — KETOCONAZOLE 20 MG/ML
SHAMPOO, SUSPENSION TOPICAL
COMMUNITY
Start: 2025-01-03

## 2025-01-17 RX ORDER — FOLIC ACID 1 MG/1
1 TABLET ORAL DAILY
Qty: 90 TABLET | Refills: 3 | Status: SHIPPED | OUTPATIENT
Start: 2025-01-17

## 2025-01-17 RX ORDER — KETOCONAZOLE 20 MG/G
CREAM TOPICAL
COMMUNITY
Start: 2025-01-03

## 2025-01-17 NOTE — PROGRESS NOTES
Chief Complaint  Dizziness (Hx of vertigo), Numbness (As well as pain in back of head started 2 months ago), Eye Problem (Vision changes), Fatigue (Possibly from lexapro, taking in morning will try at night), Tinnitus (Burning sensation), and Anxiety (Due to wife being diagnosed with cancer)    Subjective        Missael Maloney presents to [facility]  History of Present Illness  Lightheaded and numbness in the back of the head and burning sensation of year.  Also dizziness at times.  Eye vision.  Has at times shadowy and blurry but is intermittent.  Fatigue for a year and thought was hypothyroid but has continued and eyes are feeling heavy.  And is sleepy.  Having some numbness in fingers.  Last sugar was elevated but wasn't fasting.  Dizziness  Symptoms include fatigue.    Eye Problem     Fatigue  Symptoms include fatigue.    Tinnitus  Symptoms include fatigue.    Anxiety   Symptoms include dizziness.       The following portions of the patient's history were personally reviewed and updated as appropriate: allergies, current medications, past medical history, past surgical history, past family history, and past social history.     Body mass index is 25.06 kg/m².           Past History:    Medical History: has a past medical history of Anxiety, GERD (gastroesophageal reflux disease), H. pylori infection (2021), Hyperlipidemia, and Hypothyroidism.     Surgical History: has a past surgical history that includes Colonoscopy; Esophagogastroduodenoscopy (N/A, 09/11/2023); Upper gastrointestinal endoscopy; Esophagogastroduodenoscopy (N/A, 12/3/2024); and Colonoscopy (N/A, 12/23/2024).     Family History: family history includes Cancer in his father; Colon cancer in his father; No Known Problems in his brother, cousin, maternal aunt, maternal grandfather, maternal grandmother, maternal uncle, mother, paternal aunt, paternal grandfather, paternal grandmother, paternal uncle, sister,  "and another family member.     Social History: reports that he has never smoked. He has never been exposed to tobacco smoke. He has never used smokeless tobacco. He reports that he does not drink alcohol and does not use drugs.    Allergies: Patient has no known allergies.          Current Outpatient Medications:     atorvastatin (LIPITOR) 10 MG tablet, TAKE 1 TABLET BY MOUTH EVERY DAY IN THE EVENING, Disp: 90 tablet, Rfl: 1    Cholecalciferol (Vitamin D3) 50 MCG (2000 UT) capsule, Take 1 capsule by mouth Daily., Disp: , Rfl:     escitalopram (Lexapro) 10 MG tablet, Take 1 tablet by mouth Daily., Disp: 90 tablet, Rfl: 1    fexofenadine (Allegra Allergy) 180 MG tablet, Take 1 tablet by mouth Daily., Disp: 90 tablet, Rfl: 3    ketoconazole (NIZORAL) 2 % cream, APPLY BY TOPICAL ROUTE ON EARS DAILY, Disp: , Rfl:     ketoconazole (NIZORAL) 2 % shampoo, APPLY SHAMPOO TO FACE, EARS AND SCALP X 5-10 MINUTES AND THEN RINSE, Disp: , Rfl:     levothyroxine (SYNTHROID, LEVOTHROID) 25 MCG tablet, TAKE 1 TABLET BY MOUTH EVERY DAY IN THE MORNING, Disp: 90 tablet, Rfl: 1    Omega-3 Fatty Acids (OMEGA 3 500 PO), Take  by mouth., Disp: , Rfl:     pantoprazole (PROTONIX) 40 MG EC tablet, Take 1 tablet by mouth Daily., Disp: 30 tablet, Rfl: 5    traZODone (DESYREL) 50 MG tablet, Take 1 tablet by mouth Every Night., Disp: 30 tablet, Rfl: 2    Turmeric (QC TUMERIC COMPLEX PO), Take  by mouth., Disp: , Rfl:     folic acid (FOLVITE) 1 MG tablet, Take 1 tablet by mouth Daily., Disp: 90 tablet, Rfl: 3    There are no discontinued medications.      Review of Systems   Constitutional:  Positive for fatigue.   HENT:  Positive for tinnitus.    Neurological:  Positive for dizziness.        Objective         Vitals:    01/17/25 0937   BP: 136/74   BP Location: Right arm   Patient Position: Sitting   Cuff Size: Adult   Pulse: 89   Temp: 97.9 °F (36.6 °C)   TempSrc: Temporal   SpO2: 99%   Weight: 70.4 kg (155 lb 3.2 oz)   Height: 167.6 cm (65.98\") "     Body mass index is 25.06 kg/m².         Physical Exam  Vitals reviewed.   Constitutional:       Appearance: Normal appearance. He is well-developed.   HENT:      Head: Normocephalic and atraumatic.      Mouth/Throat:      Pharynx: No oropharyngeal exudate.   Eyes:      Conjunctiva/sclera: Conjunctivae normal.      Pupils: Pupils are equal, round, and reactive to light.   Cardiovascular:      Rate and Rhythm: Normal rate and regular rhythm.      Heart sounds: Normal heart sounds. No murmur heard.     No friction rub. No gallop.   Pulmonary:      Effort: Pulmonary effort is normal.      Breath sounds: Normal breath sounds. No wheezing or rhonchi.   Skin:     General: Skin is warm and dry.   Neurological:      Mental Status: He is alert and oriented to person, place, and time.   Psychiatric:         Mood and Affect: Mood and affect normal.         Behavior: Behavior normal.         Thought Content: Thought content normal.         Judgment: Judgment normal.             Result Review :               Assessment and Plan     Diagnoses and all orders for this visit:    1. Dizziness (Primary)  -     MRI Brain Without Contrast; Future  -     Duplex Carotid Ultrasound CAR; Future  -     folic acid (FOLVITE) 1 MG tablet; Take 1 tablet by mouth Daily.  Dispense: 90 tablet; Refill: 3    2. Vision changes  -     MRI Brain Without Contrast; Future    3. Fatigue, unspecified type  -     Cortisol - AM; Future  -     Testosterone; Future              Follow Up     Return for Next scheduled follow up.    Patient was given instructions and counseling regarding his condition or for health maintenance advice. Please see specific information pulled into the AVS if appropriate.

## 2025-01-23 ENCOUNTER — OFFICE VISIT (OUTPATIENT)
Dept: GASTROENTEROLOGY | Facility: CLINIC | Age: 45
End: 2025-01-23
Payer: COMMERCIAL

## 2025-01-23 VITALS
DIASTOLIC BLOOD PRESSURE: 74 MMHG | WEIGHT: 152.8 LBS | BODY MASS INDEX: 24.67 KG/M2 | OXYGEN SATURATION: 99 % | HEART RATE: 75 BPM | SYSTOLIC BLOOD PRESSURE: 124 MMHG

## 2025-01-23 DIAGNOSIS — Z86.19 HISTORY OF HELICOBACTER PYLORI INFECTION: ICD-10-CM

## 2025-01-23 DIAGNOSIS — K21.9 GASTROESOPHAGEAL REFLUX DISEASE, UNSPECIFIED WHETHER ESOPHAGITIS PRESENT: Primary | ICD-10-CM

## 2025-01-23 DIAGNOSIS — Z80.0 FAMILY HISTORY OF COLON CANCER IN FATHER: ICD-10-CM

## 2025-01-23 DIAGNOSIS — R10.13 EPIGASTRIC PAIN: ICD-10-CM

## 2025-01-23 PROCEDURE — 99214 OFFICE O/P EST MOD 30 MIN: CPT

## 2025-01-23 NOTE — PROGRESS NOTES
Chief Complaint     Follow-up (Colon/EGD f/u, CT results/Pt wanted to discuss pantoprazole and see if this was going to be a long term medication etc )    History of Present Illness     Missael Maloney is a 44 y.o. male who presents to [facility] for follow-up of GERD.    History of present Illness  1/23/2025 patient presents to the GI office of Dr. Brown for follow-up of GERD.  Patient had EGD and colonoscopy in December 2024 as well as a CT of the abdomen and pelvis.  Heartburn is controlled with Protonix. Started taking Protonix in October 2023, but patient would like to know the game plan for discontinuing Protonix.  He states he he started following a GERD prevention diet.  No reports of nausea, vomiting, hematemesis, dysphagia, or unintentional weight loss.  No reports of constipation, diarrhea, hematochezia, melena or abdominal pain.    12/27/2024 CT abdomen and pelvis- No acute findings are evident.     12/23/2024 colonoscopy performed by Dr. Brown-the entire exam of the colon was normal.  No specimens collected.  Repeat colonoscopy in 5 years for surveillance.    12/3/2024 EGD performed by Dr. Brown- Esophageal biopsies normal. Stomach biopsies benign. Biopsies are negative for H. Pylori, dysplasia, metaplasia, and malignancy. Recommend continuing Protonix 40mg daily. If still having persistent symptoms then I recommend CT for further evaluation.     Establish care through  for dyspepsia   EGD 09/11/2023 by Dr. 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  Esophageal and duodenal biopsies normal.  Stomach biopsies-mild chronic and focal active gastritis.     Last office 9/22/23 - Reflux symptoms controlled Protonix 40mg daily, cannot skip a dose without return of symptoms. Family history of colon cancer in his father. Last colonoscopy at age 41 which was normal.      Patient  presents to the office for epigastric pain. Symptoms previously controlled with Protonix. He was started on thyroid medication and had to switch timing of PPI resulting in a flare of symptoms. Currently have epigastric pain/burning, heartburn, and dysphagia. He is currently taking Protonix 40mg at night with little relief.         History      Past Medical History:   Diagnosis Date    Anxiety     GERD (gastroesophageal reflux disease)     H. pylori infection 2021    Hyperlipidemia     Hypothyroidism      Past Surgical History:   Procedure Laterality Date    COLONOSCOPY      2 YRS    COLONOSCOPY N/A 12/23/2024    Procedure: COLONOSCOPY;  Surgeon: Lusi Brown MD;  Location: [facility] END[facility]COPY;  Service: Gastroenterology;  Laterality: N/A;  HEMORRHOIDS AND SKIN TAGS    ENDOSCOPY N/A 09/11/2023    Procedure: ESOPHAGOGASTRODUODENOSCOPY with biopsies;  Surgeon: Luis Brown MD;  Location: [facility] ENDOSCOPY;  Service: Gastroenterology;  Laterality: N/A;  gastric inlet patch, hiatal hernia    ENDOSCOPY N/A 12/3/2024    Procedure: ESOPHAGOGASTRODUODENOSCOPY WITH BIOPSIES;  Surgeon: Luis Brown MD;  Location: [facility] ENDOSCOPY;  Service: Gastroenterology;  Laterality: N/A;  GASTRIC INLET PATCH    UPPER GASTROINTESTINAL ENDOSCOPY       Family History   Problem Relation Age of Onset    No Known Problems Mother     Colon cancer Father     Cancer Father     No Known Problems Sister     No Known Problems Brother     No Known Problems Maternal Aunt     No Known Problems Paternal Aunt     No Known Problems Maternal Uncle     No Known Problems Paternal Uncle     No Known Problems Maternal Grandfather     No Known Problems Maternal Grandmother     No Known Problems Paternal Grandfather     No Known Problems Paternal Grandmother     No Known Problems Cousin     No Known Problems Other     ADD / ADHD Neg Hx     Alcohol abuse Neg Hx     Anxiety disorder Neg Hx     Bipolar disorder Neg Hx     Dementia Neg Hx      Depression Neg Hx     Drug abuse Neg Hx     OCD Neg Hx     Paranoid behavior Neg Hx     Schizophrenia Neg Hx     Seizures Neg Hx     Self-Injurious Behavior  Neg Hx     Suicide Attempts Neg Hx         Current Medications       Current Outpatient Medications:     atorvastatin (LIPITOR) 10 MG tablet, TAKE 1 TABLET BY MOUTH EVERY DAY IN THE EVENING, Disp: 90 tablet, Rfl: 1    Cholecalciferol (Vitamin D3) 50 MCG (2000 UT) capsule, Take 1 capsule by mouth Daily., Disp: , Rfl:     escitalopram (Lexapro) 10 MG tablet, Take 1 tablet by mouth Daily., Disp: 90 tablet, Rfl: 1    folic acid (FOLVITE) 1 MG tablet, Take 1 tablet by mouth Daily., Disp: 90 tablet, Rfl: 3    ketoconazole (NIZORAL) 2 % cream, APPLY BY TOPICAL ROUTE ON EARS DAILY, Disp: , Rfl:     ketoconazole (NIZORAL) 2 % shampoo, APPLY SHAMPOO TO FACE, EARS AND SCALP X 5-10 MINUTES AND THEN RINSE, Disp: , Rfl:     levothyroxine (SYNTHROID, LEVOTHROID) 25 MCG tablet, TAKE 1 TABLET BY MOUTH EVERY DAY IN THE MORNING, Disp: 90 tablet, Rfl: 1    Omega-3 Fatty Acids (OMEGA 3 500 PO), Take  by mouth., Disp: , Rfl:     pantoprazole (PROTONIX) 40 MG EC tablet, Take 1 tablet by mouth Daily., Disp: 30 tablet, Rfl: 5    Turmeric (QC TUMERIC COMPLEX PO), Take  by mouth., Disp: , Rfl:     fexofenadine (Allegra Allergy) 180 MG tablet, Take 1 tablet by mouth Daily. (Patient not taking: Reported on 1/23/2025), Disp: 90 tablet, Rfl: 3    traZODone (DESYREL) 50 MG tablet, Take 1 tablet by mouth Every Night. (Patient not taking: Reported on 1/23/2025), Disp: 30 tablet, Rfl: 2     Allergies     No Known Allergies    Social History       Social History     Social History Narrative    Not on file         Objective       /74 (BP Location: Left arm, Patient Position: Sitting, Cuff Size: Adult)   Pulse 75   Wt 69.3 kg (152 lb 12.8 oz)   SpO2 99%   BMI 24.67 kg/m²       Physical Exam  HENT:      Head: Normocephalic.   Cardiovascular:      Rate and Rhythm: Normal rate.   Pulmonary:  "     Effort: Pulmonary effort is normal.   Musculoskeletal:         General: Normal range of motion.   Neurological:      General: No focal deficit present.      Mental Status: He is alert.   Psychiatric:         Mood and Affect: Mood normal.         Results       Result Review :    The following data was reviewed by: CHAR Sparks on 01/23/2025:    Lab Results - Last 18 Months   Lab Units 12/13/24  0943 11/14/24  0727 07/29/24  0755   WBC 10*3/mm3 5.75 5.76 5.71   HEMOGLOBIN g/dL 15.3 14.9 15.7   MCV fL 83.8 83.6 82.5   PLATELETS 10*3/mm3 237 232 239         Lab Results - Last 18 Months   Lab Units 12/13/24  0943 11/08/24  1541 07/29/24  0755   BUN mg/dL 14 10 10   CREATININE mg/dL 1.02 0.94 1.03   SODIUM mmol/L 140 143 140   POTASSIUM mmol/L 4.2 4.5 4.1   CHLORIDE mmol/L 106 106 103   CO2 mmol/L 28.0 26.9 26.1   GLUCOSE mg/dL 113* 97 101*      No results for input(s): \"PROTIME\", \"INR\", \"PTT\" in the last 80337 hours.  Lab Results - Last 18 Months   Lab Units 12/13/24  0943 11/08/24  1541 07/29/24  0755   AST (SGOT) U/L 15 13 20   ALT (SGPT) U/L 18 14 24   ALK PHOS U/L 74 76 75   BILIRUBIN mg/dL 0.6 0.3 0.4   TOTAL PROTEIN g/dL 7.2 7.5 7.7   ALBUMIN g/dL 4.4 4.6 4.6      Lab Results - Last 18 Months   Lab Units 11/14/24  0727 07/29/24  0755 04/26/24  0858   VITAMIN B 12 pg/mL 411 397 358   FOLATE ng/mL 10.90 16.80 15.10     No results for input(s): \"HAV\", \"HEPAIGM\", \"HEPBIGM\", \"HEPBCAB\", \"HBEAG\", \"HEPCAB\" in the last 16904 hours.    CT Abdomen Pelvis With Contrast    Result Date: 12/27/2024  Impression: No acute findings are evident. Oral contrast extends into the rectum. Portions of the colon are not well distended but no acute findings are seen. Electronically Signed: Ramón Barnes MD  12/27/2024 5:14 PM EST  Workstation ID: [id number]    MRI Knee Left Without Contrast    Result Date: 10/30/2024  Impression: Unremarkable MRI of the left knee. Intact menisci and ligamentous structures. " Electronically Signed: Marvin Akins MD  10/30/2024 4:33 PM EDT  Workstation ID: [id number]    MRI Knee Right Without Contrast    Result Date: 10/30/2024  Impression: Unremarkable MRI of the right knee. Intact menisci and ligamentous structures. Electronically Signed: Marvin Akins MD  10/30/2024 4:31 PM EDT  Workstation ID: [id number]           Assessment and Plan              Diagnoses and all orders for this visit:    1. Gastroesophageal reflux disease, unspecified whether esophagitis present (Primary)    2. History of Helicobacter pylori infection    3. Family history of colon cancer in father    4. Epigastric pain          * Surgery not found *    Follow Up     Follow Up   Return in about 3 months (around 4/23/2025) for GERD with Fiona.    Advised patient to continue the Protonix 40 mg daily for 3 months.   After 3 months we can decrease the dose of Protonix to 20 mg daily for 1 month and then patient can discontinue use of medication.  Patient verbalizes understanding of plan.   Repeat colonoscopy in 5 years for surveillance.    Patient was given instructions and counseling regarding his condition or for health maintenance advice. Please see specific information pulled into the AVS if appropriate.

## 2025-01-29 ENCOUNTER — PROCEDURE VISIT (OUTPATIENT)
Dept: OTOLARYNGOLOGY | Facility: CLINIC | Age: 45
End: 2025-01-29
Payer: COMMERCIAL

## 2025-01-29 ENCOUNTER — OFFICE VISIT (OUTPATIENT)
Dept: OTOLARYNGOLOGY | Facility: CLINIC | Age: 45
End: 2025-01-29
Payer: COMMERCIAL

## 2025-01-29 VITALS
OXYGEN SATURATION: 98 % | TEMPERATURE: 97.1 F | HEART RATE: 83 BPM | DIASTOLIC BLOOD PRESSURE: 83 MMHG | SYSTOLIC BLOOD PRESSURE: 112 MMHG

## 2025-01-29 DIAGNOSIS — R51.9 HEADACHE DISORDER: ICD-10-CM

## 2025-01-29 DIAGNOSIS — R42 VERTIGO: Primary | ICD-10-CM

## 2025-01-29 DIAGNOSIS — H93.13 TINNITUS OF BOTH EARS: Primary | ICD-10-CM

## 2025-01-29 DIAGNOSIS — R42 VERTIGO: ICD-10-CM

## 2025-01-29 DIAGNOSIS — H93.13 TINNITUS OF BOTH EARS: ICD-10-CM

## 2025-01-29 PROCEDURE — 99204 OFFICE O/P NEW MOD 45 MIN: CPT | Performed by: OTOLARYNGOLOGY

## 2025-01-29 PROCEDURE — 92567 TYMPANOMETRY: CPT | Performed by: AUDIOLOGIST

## 2025-01-29 PROCEDURE — 92557 COMPREHENSIVE HEARING TEST: CPT | Performed by: AUDIOLOGIST

## 2025-01-29 NOTE — PROGRESS NOTES
Patient Name: Missael Maloney   Visit Date: 01/29/2025   Patient ID: [id number]  Provider: Chris Sarkar MD    Sex: male  Location: [facility]   YOB: 1980  Location Address: [address] [place] [address], [address] [place] [address],?[place]?[id number]    Primary Care Provider Danielle Esteban APRN  Location Phone: [phone]    Referring Provider: CHAR Bui        Chief Complaint  Dizziness/tinnitus    History of Present Illness  Missael Maloney is a 44 y.o. male with past medical history significant for anxiety depression who presents to [facility] today as a consult from CHAR Bui for evaluation of dizziness.  He tells me that in March 2023 he acutely developed vertigo.  He cannot recall any inciting illness or incident.  This was associated with nausea and vomiting.  His initial episode lasted anywhere from 5 to 10 minutes and resolved without intervention.  This occurred 2 more times over the next few days.  He felt as though his vertigo would improve with closing his eyes.  He did not notice any change in his hearing during the episodes.  He cannot recall any headache at the time.  Since that time, he has noticed brief episodes of vertigo when moving his head quickly and more specifically when twirling while dancing with his daughter.  He reports left greater than right tinnitus which is constant.  He experiences occasional otalgia.  He does report a constant occipital headache and occasional scalp numbness on most days.  He denies any photophobia, phonophobia, nausea, or vomiting.  He feels as though the headache has become worse since he started levothyroxine. He has an order for an MRI of the brain and carotid Doppler ordered by his primary care provider.  He has not undergone any imaging at the time of his appointment.    Past Medical History:   Diagnosis Date    Anxiety     GERD (gastroesophageal reflux  disease)     H. pylori infection 2021    Hyperlipidemia     Hypothyroidism        Past Surgical History:   Procedure Laterality Date    COLONOSCOPY      2 YRS    COLONOSCOPY N/A 12/23/2024    Procedure: COLONOSCOPY;  Surgeon: Luis Brown MD;  Location: [facility] ENDOSCOPY;  Service: Gastroenterology;  Laterality: N/A;  HEMORRHOIDS AND SKIN TAGS    ENDOSCOPY N/A 09/11/2023    Procedure: ESOPHAGOGASTRODUODENOSCOPY with biopsies;  Surgeon: Luis Brown MD;  Location: [facility] ENDOSCOPY;  Service: Gastroenterology;  Laterality: N/A;  gastric inlet patch, hiatal hernia    ENDOSCOPY N/A 12/3/2024    Procedure: ESOPHAGOGASTRODUODENOSCOPY WITH BIOPSIES;  Surgeon: Luis Brown MD;  Location: [facility] ENDOSCOPY;  Service: Gastroenterology;  Laterality: N/A;  GASTRIC INLET PATCH    UPPER GASTROINTESTINAL ENDOSCOPY           Current Outpatient Medications:     atorvastatin (LIPITOR) 10 MG tablet, TAKE 1 TABLET BY MOUTH EVERY DAY IN THE EVENING, Disp: 90 tablet, Rfl: 1    Cholecalciferol (Vitamin D3) 50 MCG (2000 UT) capsule, Take 1 capsule by mouth Daily., Disp: , Rfl:     escitalopram (Lexapro) 10 MG tablet, Take 1 tablet by mouth Daily., Disp: 90 tablet, Rfl: 1    folic acid (FOLVITE) 1 MG tablet, Take 1 tablet by mouth Daily., Disp: 90 tablet, Rfl: 3    ketoconazole (NIZORAL) 2 % cream, APPLY BY TOPICAL ROUTE ON EARS DAILY, Disp: , Rfl:     ketoconazole (NIZORAL) 2 % shampoo, APPLY SHAMPOO TO FACE, EARS AND SCALP X 5-10 MINUTES AND THEN RINSE, Disp: , Rfl:     levothyroxine (SYNTHROID, LEVOTHROID) 25 MCG tablet, TAKE 1 TABLET BY MOUTH EVERY DAY IN THE MORNING, Disp: 90 tablet, Rfl: 1    Omega-3 Fatty Acids (OMEGA 3 500 PO), Take  by mouth., Disp: , Rfl:     pantoprazole (PROTONIX) 40 MG EC tablet, Take 1 tablet by mouth Daily., Disp: 30 tablet, Rfl: 5    Turmeric (QC TUMERIC COMPLEX PO), Take  by mouth., Disp: , Rfl:     fexofenadine (Allegra Allergy) 180 MG tablet, Take 1 tablet by mouth Daily. (Patient  not taking: Reported on 1/29/2025), Disp: 90 tablet, Rfl: 3    traZODone (DESYREL) 50 MG tablet, Take 1 tablet by mouth Every Night. (Patient not taking: Reported on 1/29/2025), Disp: 30 tablet, Rfl: 2     No Known Allergies    Social History     Tobacco Use    Smoking status: Never     Passive exposure: Never    Smokeless tobacco: Never   Vaping Use    Vaping status: Never Used   Substance Use Topics    Alcohol use: Never    Drug use: Never        Objective     Vital Signs:   /83   Pulse 83   Temp 97.1 °F (36.2 °C)   SpO2 98%       Physical Exam    General: Well developed, well nourished patient of stated age in no acute distress. Voice is strong and clear.   Head: Normocephalic and atraumatic.  Face: No lesions.  Bilateral parotid and submandibular glands are unremarkable.  Stensen's and Warthin's ducts are productive of clear saliva bilaterally.  House-Brackmann I/VI     bilaterally.   muscles and temporomandibular joint nontender to palpation.  No TMJ crepitus.  Eyes: PERRLA, sclerae anicteric, no conjunctival injection. Extraocular movements are intact and full. No nystagmus.   Ears: Auricles are normal in appearance. Bilateral external auditory canals are unremarkable. Bilateral tympanic membranes are clear and without effusion. Hearing normal to conversational voice.   Nose: External nose is normal in appearance. Bilateral nares are patent with normal appearing mucosa. Septum midline. Turbinates are unremarkable. No lesions.   Oral Cavity: Lips are normal in appearance. Oral mucosa is unremarkable. Gingiva is unremarkable.  Partial dentition for age. Tongue with a geographic appearance but otherwise unremarkable with good movement. Hard palate is unremarkable.   Oropharynx: Soft palate is unremarkable with full movement. Uvula is unremarkable. Bilateral tonsils are unremarkable. Posterior oropharynx is unremarkable.    Larynx and hypopharynx: Deferred secondary to gag reflex.  Neck: Supple.   No mass.  Nontender to palpation.  Trachea midline. Thyroid normal size and without nodules to palpation.   Lymphatic: No lymphadenopathy upon palpation.  Respiratory: Clear to auscultation bilaterally, nonlabored respirations    Cardiovascular: RRR, no murmurs, rubs, or gallops,   Psychiatric: Appropriate affect, cooperative   Neurologic: Oriented x 3, strength symmetric in all extremities, Cranial Nerves II-XII are grossly intact to confrontation.  Bilateral finger-to-nose and heel-to-shin are normal.  Romberg testing is normal.  Sindy-Hallpike testing is negative.  Fukuda step testing is negative.   Skin: Warm and dry. No rashes.    Procedures           Result Review :               Assessment and Plan    Diagnoses and all orders for this visit:    1. Vertigo (Primary)  -     Audiometry With Tympanometry; Future    2. Tinnitus of both ears  -     Audiometry With Tympanometry; Future    3. Headache disorder    Impressions and findings were discussed at great length.  Currently, he is seen for evaluation of episodes of vertigo which occurred in March 2023 and now he experiences very brief episodes with quick head movements.  He also mentions a constant occipital headache and left greater than right tinnitus.  Examination today is unremarkable.  Audiogram on 1/29/2025 revealed normal hearing in the right ear and left normal downsloping to borderline sensorineural hearing loss at 6724-4649 Hz.  SRT was 10 on the right and 5 on the left.  Word recognition was 93% on the right at 60 dB and 87% on the left at 55 dB.  Tympanograms are type A.  We discussed the differential for his symptoms.  We discussed that this does not seem consistent with a typical vestibular neuronitis but that he does display symptoms consistent with persistent peripheral vestibular weakness.  We also discussed the possibility of vestibular migraine although his headache symptoms do not fit a classic migraine.  Options for further evaluation were  discussed and he mentioned that he is moving to [place] in 2 weeks.  We discussed that I would be happy to refer him to an ENT in [place] for potential video nystagmography and vestibular rehabilitation as I am doubtful this can be performed prior to his upcoming move.  If he is able to get his MRI done before moving I would be happy to review the images.  He was given ample time to ask questions, all of which were answered to his satisfaction.      Follow Up   Return if symptoms worsen or fail to improve.  Patient was given instructions and counseling regarding his condition or for health maintenance advice. Please see specific information pulled into the AVS if appropriate.

## 2025-01-29 NOTE — PROGRESS NOTES
AUDIOMETRIC EVALUATION      Name:  Missael Maloney  :  1980  Age:  44 y.o.  Date of Evaluation:  2025       History:  Mr. Julissa Maloney is seen today for a hearing evaluation due to periodic dizziness.    Audiologic Information:  Concerns for Hearing: Sometime  PETs: No  Other otologic surgical history: No  Aural Pressure/Fullness: No  Otalgia: No  Otorrhea: No  Tinnitus: No  Dizziness: Periodically  Noise Exposure: No  Family history of hearing loss: No  Head trauma requiring hospital stay: No  Chemotherapy: No  Other significant history: No    EVALUATION:    See audiogram    RESULTS:    Otoscopic Evaluation:        NOTE: Testing completed after ears were examined by Dr. Sarkar    Tympanometry (226 Hz):  Right: Type A  Left: Type A    IMPRESSIONS:  Pure tone thresholds for the right ear shows hearing within normal limits.  Pure tone thresholds for the left ear shows mild sensorineural hearing loss at 4K at 8K hertz.  Patient was counseled with regard to the findings.    RECOMMENDATIONS/PLAN:  Follow-up recommendations with Dr. Sarkar  Discussed results and recommendations with patient. Questions were addressed and the patient was encouraged to contact our department should concerns arise.          Ash Hurtado M.S, [facility]  Licensed Audiologist

## 2025-02-03 ENCOUNTER — LAB (OUTPATIENT)
Dept: LAB | Facility: HOSPITAL | Age: 45
End: 2025-02-03
Payer: COMMERCIAL

## 2025-02-03 DIAGNOSIS — R53.83 FATIGUE, UNSPECIFIED TYPE: ICD-10-CM

## 2025-02-03 LAB
CORTIS AM PEAK SERPL-MCNC: 24.57 MCG/DL (ref 6.02–18.4)
TESTOST SERPL-MCNC: 278 NG/DL (ref 249–836)

## 2025-02-03 PROCEDURE — 84403 ASSAY OF TOTAL TESTOSTERONE: CPT

## 2025-02-03 PROCEDURE — 36415 COLL VENOUS BLD VENIPUNCTURE: CPT

## 2025-02-03 PROCEDURE — 82533 TOTAL CORTISOL: CPT

## 2025-02-18 RX ORDER — LEVOTHYROXINE SODIUM 25 UG/1
25 TABLET ORAL EVERY MORNING
Qty: 90 TABLET | Refills: 1 | Status: SHIPPED | OUTPATIENT
Start: 2025-02-18

## 2025-04-04 RX ORDER — ATORVASTATIN CALCIUM 10 MG/1
10 TABLET, FILM COATED ORAL EVERY EVENING
Qty: 90 TABLET | Refills: 1 | Status: SHIPPED | OUTPATIENT
Start: 2025-04-04

## 2025-04-17 ENCOUNTER — TELEPHONE (OUTPATIENT)
Dept: GASTROENTEROLOGY | Facility: CLINIC | Age: 45
End: 2025-04-17
Payer: COMMERCIAL

## (undated) DEVICE — Device: Brand: DEFENDO AIR/WATER/SUCTION AND BIOPSY VALVE

## (undated) DEVICE — LINER SURG CANSTR SXN S/RIGD 1500CC

## (undated) DEVICE — CONN JET HYDRA H20 AUXILIARY DISP

## (undated) DEVICE — Device

## (undated) DEVICE — BLCK/BITE BLOX WO/DENTL/RIM W/STRAP 54F

## (undated) DEVICE — SINGLE-USE BIOPSY FORCEPS: Brand: RADIAL JAW 4

## (undated) DEVICE — SOL IRRG H2O PL/BG 1000ML STRL

## (undated) DEVICE — SOLIDIFIER LIQLOC PLS 1500CC BT